# Patient Record
Sex: FEMALE | Race: WHITE | Employment: OTHER | URBAN - METROPOLITAN AREA
[De-identification: names, ages, dates, MRNs, and addresses within clinical notes are randomized per-mention and may not be internally consistent; named-entity substitution may affect disease eponyms.]

---

## 2017-01-01 ENCOUNTER — HOSPITAL ENCOUNTER (OUTPATIENT)
Dept: OTHER | Age: 77
Discharge: HOME | End: 2017-01-31
Attending: INTERNAL MEDICINE | Admitting: INTERNAL MEDICINE

## 2017-01-03 RX ORDER — FOLIC ACID 1 MG/1
TABLET ORAL
Qty: 90 TABLET | Refills: 1 | Status: SHIPPED | OUTPATIENT
Start: 2017-01-03 | End: 2017-07-15 | Stop reason: SDUPTHER

## 2017-01-03 RX ORDER — OXYBUTYNIN CHLORIDE 5 MG/1
TABLET ORAL
Qty: 180 TABLET | Refills: 1 | Status: SHIPPED | OUTPATIENT
Start: 2017-01-03 | End: 2017-05-26 | Stop reason: SDUPTHER

## 2017-01-03 RX ORDER — ATORVASTATIN CALCIUM 40 MG/1
TABLET, FILM COATED ORAL
Qty: 90 TABLET | Refills: 1 | Status: SHIPPED | OUTPATIENT
Start: 2017-01-03 | End: 2017-05-26 | Stop reason: SDUPTHER

## 2017-01-03 RX ORDER — LEVOTHYROXINE SODIUM 0.1 MG/1
TABLET ORAL
Qty: 90 TABLET | Refills: 1 | Status: SHIPPED | OUTPATIENT
Start: 2017-01-03 | End: 2017-05-26 | Stop reason: SDUPTHER

## 2017-01-09 ENCOUNTER — CARE COORDINATION (OUTPATIENT)
Dept: CARE COORDINATION | Age: 77
End: 2017-01-09

## 2017-01-16 ENCOUNTER — OFFICE VISIT (OUTPATIENT)
Dept: FAMILY MEDICINE CLINIC | Age: 77
End: 2017-01-16

## 2017-01-16 ENCOUNTER — CARE COORDINATOR VISIT (OUTPATIENT)
Dept: CARE COORDINATION | Age: 77
End: 2017-01-16

## 2017-01-16 VITALS
HEART RATE: 83 BPM | WEIGHT: 190 LBS | SYSTOLIC BLOOD PRESSURE: 142 MMHG | RESPIRATION RATE: 16 BRPM | OXYGEN SATURATION: 98 % | BODY MASS INDEX: 31.62 KG/M2 | DIASTOLIC BLOOD PRESSURE: 86 MMHG

## 2017-01-16 DIAGNOSIS — E87.1 HYPONATREMIA: Primary | ICD-10-CM

## 2017-01-16 DIAGNOSIS — E53.8 B12 DEFICIENCY: ICD-10-CM

## 2017-01-16 DIAGNOSIS — F43.0 STRESS REACTION: ICD-10-CM

## 2017-01-16 LAB
ANION GAP SERPL CALCULATED.3IONS-SCNC: 17 MMOL/L (ref 3–16)
BUN BLDV-MCNC: 24 MG/DL (ref 7–20)
CALCIUM SERPL-MCNC: 8.8 MG/DL (ref 8.3–10.6)
CHLORIDE BLD-SCNC: 96 MMOL/L (ref 99–110)
CO2: 27 MMOL/L (ref 21–32)
CREAT SERPL-MCNC: 1.1 MG/DL (ref 0.6–1.2)
GFR AFRICAN AMERICAN: 58
GFR NON-AFRICAN AMERICAN: 48
GLUCOSE BLD-MCNC: 96 MG/DL (ref 70–99)
POTASSIUM SERPL-SCNC: 5.1 MMOL/L (ref 3.5–5.1)
SODIUM BLD-SCNC: 140 MMOL/L (ref 136–145)

## 2017-01-16 PROCEDURE — 1123F ACP DISCUSS/DSCN MKR DOCD: CPT | Performed by: FAMILY MEDICINE

## 2017-01-16 PROCEDURE — 1036F TOBACCO NON-USER: CPT | Performed by: FAMILY MEDICINE

## 2017-01-16 PROCEDURE — G8419 CALC BMI OUT NRM PARAM NOF/U: HCPCS | Performed by: FAMILY MEDICINE

## 2017-01-16 PROCEDURE — 1090F PRES/ABSN URINE INCON ASSESS: CPT | Performed by: FAMILY MEDICINE

## 2017-01-16 PROCEDURE — 36415 COLL VENOUS BLD VENIPUNCTURE: CPT | Performed by: FAMILY MEDICINE

## 2017-01-16 PROCEDURE — 4040F PNEUMOC VAC/ADMIN/RCVD: CPT | Performed by: FAMILY MEDICINE

## 2017-01-16 PROCEDURE — G8484 FLU IMMUNIZE NO ADMIN: HCPCS | Performed by: FAMILY MEDICINE

## 2017-01-16 PROCEDURE — G8399 PT W/DXA RESULTS DOCUMENT: HCPCS | Performed by: FAMILY MEDICINE

## 2017-01-16 PROCEDURE — 99213 OFFICE O/P EST LOW 20 MIN: CPT | Performed by: FAMILY MEDICINE

## 2017-01-16 PROCEDURE — 96372 THER/PROPH/DIAG INJ SC/IM: CPT | Performed by: FAMILY MEDICINE

## 2017-01-16 PROCEDURE — G8427 DOCREV CUR MEDS BY ELIG CLIN: HCPCS | Performed by: FAMILY MEDICINE

## 2017-01-16 RX ORDER — CYANOCOBALAMIN 1000 UG/ML
1000 INJECTION INTRAMUSCULAR; SUBCUTANEOUS ONCE
Status: COMPLETED | OUTPATIENT
Start: 2017-01-16 | End: 2017-01-16

## 2017-01-16 RX ADMIN — CYANOCOBALAMIN 1000 MCG: 1000 INJECTION INTRAMUSCULAR; SUBCUTANEOUS at 10:44

## 2017-01-24 ENCOUNTER — OFFICE VISIT (OUTPATIENT)
Dept: FAMILY MEDICINE CLINIC | Age: 77
End: 2017-01-24

## 2017-01-24 ENCOUNTER — TELEPHONE (OUTPATIENT)
Dept: CARDIOLOGY CLINIC | Age: 77
End: 2017-01-24

## 2017-01-24 VITALS
OXYGEN SATURATION: 98 % | HEART RATE: 86 BPM | TEMPERATURE: 98.1 F | DIASTOLIC BLOOD PRESSURE: 58 MMHG | BODY MASS INDEX: 31.62 KG/M2 | SYSTOLIC BLOOD PRESSURE: 118 MMHG | WEIGHT: 190 LBS

## 2017-01-24 DIAGNOSIS — J20.9 COPD WITH ACUTE BRONCHITIS (HCC): Primary | ICD-10-CM

## 2017-01-24 DIAGNOSIS — J44.0 COPD WITH ACUTE BRONCHITIS (HCC): Primary | ICD-10-CM

## 2017-01-24 PROCEDURE — 4040F PNEUMOC VAC/ADMIN/RCVD: CPT | Performed by: NURSE PRACTITIONER

## 2017-01-24 PROCEDURE — G8926 SPIRO NO PERF OR DOC: HCPCS | Performed by: NURSE PRACTITIONER

## 2017-01-24 PROCEDURE — G8427 DOCREV CUR MEDS BY ELIG CLIN: HCPCS | Performed by: NURSE PRACTITIONER

## 2017-01-24 PROCEDURE — 1090F PRES/ABSN URINE INCON ASSESS: CPT | Performed by: NURSE PRACTITIONER

## 2017-01-24 PROCEDURE — 99213 OFFICE O/P EST LOW 20 MIN: CPT | Performed by: NURSE PRACTITIONER

## 2017-01-24 PROCEDURE — G8399 PT W/DXA RESULTS DOCUMENT: HCPCS | Performed by: NURSE PRACTITIONER

## 2017-01-24 PROCEDURE — G8484 FLU IMMUNIZE NO ADMIN: HCPCS | Performed by: NURSE PRACTITIONER

## 2017-01-24 PROCEDURE — G8419 CALC BMI OUT NRM PARAM NOF/U: HCPCS | Performed by: NURSE PRACTITIONER

## 2017-01-24 PROCEDURE — 3023F SPIROM DOC REV: CPT | Performed by: NURSE PRACTITIONER

## 2017-01-24 PROCEDURE — 1123F ACP DISCUSS/DSCN MKR DOCD: CPT | Performed by: NURSE PRACTITIONER

## 2017-01-24 PROCEDURE — 1036F TOBACCO NON-USER: CPT | Performed by: NURSE PRACTITIONER

## 2017-01-24 RX ORDER — DOXYCYCLINE HYCLATE 100 MG
100 TABLET ORAL 2 TIMES DAILY
Qty: 20 TABLET | Refills: 0 | Status: SHIPPED | OUTPATIENT
Start: 2017-01-24 | End: 2017-02-03

## 2017-01-24 RX ORDER — PREDNISONE 10 MG/1
TABLET ORAL
Qty: 21 EACH | Refills: 0 | Status: SHIPPED | OUTPATIENT
Start: 2017-01-24 | End: 2017-02-28 | Stop reason: ALTCHOICE

## 2017-01-24 ASSESSMENT — ENCOUNTER SYMPTOMS
WHEEZING: 1
COUGH: 1
SHORTNESS OF BREATH: 0
SPUTUM PRODUCTION: 1

## 2017-01-26 RX ORDER — POLYETHYLENE GLYCOL 3350 17 G/17G
POWDER, FOR SOLUTION ORAL
Qty: 1581 G | Refills: 5 | Status: SHIPPED | OUTPATIENT
Start: 2017-01-26 | End: 2017-02-13 | Stop reason: SDUPTHER

## 2017-01-30 ENCOUNTER — ANTI-COAG VISIT (OUTPATIENT)
Dept: PHARMACY | Facility: CLINIC | Age: 77
End: 2017-01-30

## 2017-01-30 LAB — INR BLD: 3

## 2017-02-13 RX ORDER — POLYETHYLENE GLYCOL 3350 17 G/17G
POWDER, FOR SOLUTION ORAL
Qty: 1581 G | Refills: 5 | Status: SHIPPED | OUTPATIENT
Start: 2017-02-13 | End: 2018-02-22 | Stop reason: SDUPTHER

## 2017-02-15 DIAGNOSIS — Z71.89 ENCOUNTER FOR MEDICATION REVIEW AND COUNSELING: ICD-10-CM

## 2017-02-15 RX ORDER — FUROSEMIDE 40 MG/1
TABLET ORAL
Qty: 90 TABLET | Refills: 1 | Status: ON HOLD | OUTPATIENT
Start: 2017-02-15 | End: 2017-03-06

## 2017-02-16 ENCOUNTER — NURSE ONLY (OUTPATIENT)
Dept: FAMILY MEDICINE CLINIC | Age: 77
End: 2017-02-16

## 2017-02-16 DIAGNOSIS — E53.8 B12 DEFICIENCY: Primary | ICD-10-CM

## 2017-02-16 PROCEDURE — 96372 THER/PROPH/DIAG INJ SC/IM: CPT | Performed by: FAMILY MEDICINE

## 2017-02-16 RX ORDER — CYANOCOBALAMIN 1000 UG/ML
1000 INJECTION INTRAMUSCULAR; SUBCUTANEOUS ONCE
Status: COMPLETED | OUTPATIENT
Start: 2017-02-16 | End: 2017-02-16

## 2017-02-16 RX ADMIN — CYANOCOBALAMIN 1000 MCG: 1000 INJECTION INTRAMUSCULAR; SUBCUTANEOUS at 09:11

## 2017-02-24 RX ORDER — METOPROLOL TARTRATE 100 MG/1
TABLET ORAL
Qty: 270 TABLET | Refills: 1 | Status: SHIPPED | OUTPATIENT
Start: 2017-02-24 | End: 2017-08-30 | Stop reason: SDUPTHER

## 2017-02-27 ENCOUNTER — ANTI-COAG VISIT (OUTPATIENT)
Dept: PHARMACY | Facility: CLINIC | Age: 77
End: 2017-02-27

## 2017-02-27 LAB — INR BLD: 2.4

## 2017-02-28 ENCOUNTER — OFFICE VISIT (OUTPATIENT)
Dept: CARDIOLOGY CLINIC | Age: 77
End: 2017-02-28

## 2017-02-28 VITALS
HEIGHT: 65 IN | WEIGHT: 194 LBS | SYSTOLIC BLOOD PRESSURE: 132 MMHG | HEART RATE: 84 BPM | DIASTOLIC BLOOD PRESSURE: 60 MMHG | OXYGEN SATURATION: 97 % | BODY MASS INDEX: 32.32 KG/M2

## 2017-02-28 DIAGNOSIS — I35.0 NONRHEUMATIC AORTIC VALVE STENOSIS: ICD-10-CM

## 2017-02-28 DIAGNOSIS — R06.02 SOB (SHORTNESS OF BREATH): ICD-10-CM

## 2017-02-28 DIAGNOSIS — M54.5 LOW BACK PAIN, UNSPECIFIED BACK PAIN LATERALITY, UNSPECIFIED CHRONICITY, WITH SCIATICA PRESENCE UNSPECIFIED: ICD-10-CM

## 2017-02-28 DIAGNOSIS — I50.9 CHF (CONGESTIVE HEART FAILURE), NYHA CLASS III, UNSPECIFIED FAILURE CHRONICITY, UNSPECIFIED TYPE (HCC): Primary | Chronic | ICD-10-CM

## 2017-02-28 PROBLEM — M54.9 BACK PAIN: Status: ACTIVE | Noted: 2017-02-28

## 2017-02-28 PROCEDURE — 1036F TOBACCO NON-USER: CPT | Performed by: INTERNAL MEDICINE

## 2017-02-28 PROCEDURE — G8427 DOCREV CUR MEDS BY ELIG CLIN: HCPCS | Performed by: INTERNAL MEDICINE

## 2017-02-28 PROCEDURE — 4040F PNEUMOC VAC/ADMIN/RCVD: CPT | Performed by: INTERNAL MEDICINE

## 2017-02-28 PROCEDURE — 99214 OFFICE O/P EST MOD 30 MIN: CPT | Performed by: INTERNAL MEDICINE

## 2017-02-28 PROCEDURE — 1123F ACP DISCUSS/DSCN MKR DOCD: CPT | Performed by: INTERNAL MEDICINE

## 2017-02-28 PROCEDURE — G8399 PT W/DXA RESULTS DOCUMENT: HCPCS | Performed by: INTERNAL MEDICINE

## 2017-02-28 PROCEDURE — 1090F PRES/ABSN URINE INCON ASSESS: CPT | Performed by: INTERNAL MEDICINE

## 2017-02-28 PROCEDURE — G8484 FLU IMMUNIZE NO ADMIN: HCPCS | Performed by: INTERNAL MEDICINE

## 2017-02-28 PROCEDURE — G8417 CALC BMI ABV UP PARAM F/U: HCPCS | Performed by: INTERNAL MEDICINE

## 2017-03-01 ENCOUNTER — CARE COORDINATION (OUTPATIENT)
Dept: CARE COORDINATION | Age: 77
End: 2017-03-01

## 2017-03-02 PROBLEM — I16.0 HYPERTENSIVE URGENCY: Status: ACTIVE | Noted: 2017-03-02

## 2017-03-02 PROBLEM — J81.0 ACUTE PULMONARY EDEMA (HCC): Status: ACTIVE | Noted: 2017-03-02

## 2017-03-02 PROBLEM — E66.9 OBESITY: Chronic | Status: ACTIVE | Noted: 2017-03-02

## 2017-03-02 PROBLEM — Z86.73 HISTORY OF CVA (CEREBROVASCULAR ACCIDENT): Chronic | Status: ACTIVE | Noted: 2017-03-02

## 2017-03-02 PROBLEM — Z87.891 FORMER SMOKER: Status: ACTIVE | Noted: 2017-03-02

## 2017-03-02 PROBLEM — I50.9 ADHF (ACUTE DECOMPENSATED HEART FAILURE): Status: ACTIVE | Noted: 2017-03-02

## 2017-03-02 PROBLEM — J96.01 ACUTE HYPOXEMIC RESPIRATORY FAILURE (HCC): Status: ACTIVE | Noted: 2017-03-02

## 2017-03-03 PROBLEM — Z87.891 FORMER SMOKER: Chronic | Status: ACTIVE | Noted: 2017-03-02

## 2017-03-06 ENCOUNTER — CARE COORDINATOR VISIT (OUTPATIENT)
Dept: CASE MANAGEMENT | Age: 77
End: 2017-03-06

## 2017-03-07 ENCOUNTER — CARE COORDINATION (OUTPATIENT)
Dept: CASE MANAGEMENT | Age: 77
End: 2017-03-07

## 2017-03-07 ENCOUNTER — TELEPHONE (OUTPATIENT)
Dept: PHARMACY | Facility: CLINIC | Age: 77
End: 2017-03-07

## 2017-03-08 ENCOUNTER — ANTI-COAG VISIT (OUTPATIENT)
Dept: PHARMACY | Facility: CLINIC | Age: 77
End: 2017-03-08

## 2017-03-08 ENCOUNTER — TELEPHONE (OUTPATIENT)
Dept: FAMILY MEDICINE CLINIC | Age: 77
End: 2017-03-08

## 2017-03-08 LAB — INR BLD: 1.8

## 2017-03-09 ENCOUNTER — CARE COORDINATION (OUTPATIENT)
Dept: CASE MANAGEMENT | Age: 77
End: 2017-03-09

## 2017-03-10 ENCOUNTER — OFFICE VISIT (OUTPATIENT)
Dept: CARDIOLOGY CLINIC | Age: 77
End: 2017-03-10

## 2017-03-10 VITALS
SYSTOLIC BLOOD PRESSURE: 112 MMHG | WEIGHT: 188 LBS | DIASTOLIC BLOOD PRESSURE: 50 MMHG | OXYGEN SATURATION: 99 % | BODY MASS INDEX: 31.32 KG/M2 | HEIGHT: 65 IN | HEART RATE: 94 BPM

## 2017-03-10 DIAGNOSIS — I50.32 CHF (CONGESTIVE HEART FAILURE), NYHA CLASS III, CHRONIC, DIASTOLIC (HCC): Primary | Chronic | ICD-10-CM

## 2017-03-10 DIAGNOSIS — Z79.01 CHRONIC ANTICOAGULATION: Chronic | ICD-10-CM

## 2017-03-10 PROCEDURE — G8598 ASA/ANTIPLAT THER USED: HCPCS | Performed by: INTERNAL MEDICINE

## 2017-03-10 PROCEDURE — G8417 CALC BMI ABV UP PARAM F/U: HCPCS | Performed by: INTERNAL MEDICINE

## 2017-03-10 PROCEDURE — 99213 OFFICE O/P EST LOW 20 MIN: CPT | Performed by: INTERNAL MEDICINE

## 2017-03-10 PROCEDURE — 4040F PNEUMOC VAC/ADMIN/RCVD: CPT | Performed by: INTERNAL MEDICINE

## 2017-03-10 PROCEDURE — G8484 FLU IMMUNIZE NO ADMIN: HCPCS | Performed by: INTERNAL MEDICINE

## 2017-03-10 PROCEDURE — 1036F TOBACCO NON-USER: CPT | Performed by: INTERNAL MEDICINE

## 2017-03-10 PROCEDURE — 1111F DSCHRG MED/CURRENT MED MERGE: CPT | Performed by: INTERNAL MEDICINE

## 2017-03-10 PROCEDURE — G8427 DOCREV CUR MEDS BY ELIG CLIN: HCPCS | Performed by: INTERNAL MEDICINE

## 2017-03-10 PROCEDURE — 1123F ACP DISCUSS/DSCN MKR DOCD: CPT | Performed by: INTERNAL MEDICINE

## 2017-03-10 PROCEDURE — 1090F PRES/ABSN URINE INCON ASSESS: CPT | Performed by: INTERNAL MEDICINE

## 2017-03-10 PROCEDURE — G8399 PT W/DXA RESULTS DOCUMENT: HCPCS | Performed by: INTERNAL MEDICINE

## 2017-03-13 ENCOUNTER — ANTI-COAG VISIT (OUTPATIENT)
Dept: PHARMACY | Facility: CLINIC | Age: 77
End: 2017-03-13

## 2017-03-13 ENCOUNTER — CARE COORDINATION (OUTPATIENT)
Dept: CASE MANAGEMENT | Age: 77
End: 2017-03-13

## 2017-03-13 LAB
BUN BLDV-MCNC: 25 MG/DL (ref 8–26)
CALCIUM SERPL-MCNC: 8.8 MG/DL (ref 8.5–10.5)
CHLORIDE BLD-SCNC: 91 MEQ/L (ref 101–111)
CO2: 32 MEQ/L (ref 24–36)
CREAT SERPL-MCNC: 1.19 MG/DL (ref 0.44–1.03)
GFR AFRICAN AMERICAN: 53 ML/MIN/1.73 SQ METER
GFR NON-AFRICAN AMERICAN: 44 ML/MIN/1.73 SQ METER
GLUCOSE BLD-MCNC: 89 MG/DL (ref 70–99)
INR BLD: 2.2
OSMOLALITY CALCULATION: 270 MOSM/KG (ref 280–300)
POTASSIUM SERPL-SCNC: 4.8 MEQ/L (ref 3.6–5.4)
SODIUM BLD-SCNC: 133 MEQ/L (ref 135–145)

## 2017-03-15 ENCOUNTER — OFFICE VISIT (OUTPATIENT)
Dept: CARDIOLOGY CLINIC | Age: 77
End: 2017-03-15

## 2017-03-15 VITALS — SYSTOLIC BLOOD PRESSURE: 122 MMHG | DIASTOLIC BLOOD PRESSURE: 54 MMHG | OXYGEN SATURATION: 97 % | HEART RATE: 87 BPM

## 2017-03-15 DIAGNOSIS — R06.09 DYSPNEA ON EXERTION: ICD-10-CM

## 2017-03-15 DIAGNOSIS — I50.32 CHRONIC DIASTOLIC CHF (CONGESTIVE HEART FAILURE) (HCC): ICD-10-CM

## 2017-03-15 DIAGNOSIS — I10 ESSENTIAL HYPERTENSION: Chronic | ICD-10-CM

## 2017-03-15 DIAGNOSIS — I50.32 CHF (CONGESTIVE HEART FAILURE), NYHA CLASS III, CHRONIC, DIASTOLIC (HCC): Primary | Chronic | ICD-10-CM

## 2017-03-15 PROBLEM — J81.0 ACUTE PULMONARY EDEMA (HCC): Status: RESOLVED | Noted: 2017-03-02 | Resolved: 2017-03-15

## 2017-03-15 PROBLEM — I50.9 ADHF (ACUTE DECOMPENSATED HEART FAILURE): Status: RESOLVED | Noted: 2017-03-02 | Resolved: 2017-03-15

## 2017-03-15 PROBLEM — J96.01 ACUTE HYPOXEMIC RESPIRATORY FAILURE (HCC): Status: RESOLVED | Noted: 2017-03-02 | Resolved: 2017-03-15

## 2017-03-15 PROCEDURE — 1123F ACP DISCUSS/DSCN MKR DOCD: CPT | Performed by: NURSE PRACTITIONER

## 2017-03-15 PROCEDURE — 1036F TOBACCO NON-USER: CPT | Performed by: NURSE PRACTITIONER

## 2017-03-15 PROCEDURE — G8598 ASA/ANTIPLAT THER USED: HCPCS | Performed by: NURSE PRACTITIONER

## 2017-03-15 PROCEDURE — G8484 FLU IMMUNIZE NO ADMIN: HCPCS | Performed by: NURSE PRACTITIONER

## 2017-03-15 PROCEDURE — 1111F DSCHRG MED/CURRENT MED MERGE: CPT | Performed by: NURSE PRACTITIONER

## 2017-03-15 PROCEDURE — 4040F PNEUMOC VAC/ADMIN/RCVD: CPT | Performed by: NURSE PRACTITIONER

## 2017-03-15 PROCEDURE — 1090F PRES/ABSN URINE INCON ASSESS: CPT | Performed by: NURSE PRACTITIONER

## 2017-03-15 PROCEDURE — 99214 OFFICE O/P EST MOD 30 MIN: CPT | Performed by: NURSE PRACTITIONER

## 2017-03-15 PROCEDURE — G8399 PT W/DXA RESULTS DOCUMENT: HCPCS | Performed by: NURSE PRACTITIONER

## 2017-03-15 PROCEDURE — G8427 DOCREV CUR MEDS BY ELIG CLIN: HCPCS | Performed by: NURSE PRACTITIONER

## 2017-03-15 PROCEDURE — G8417 CALC BMI ABV UP PARAM F/U: HCPCS | Performed by: NURSE PRACTITIONER

## 2017-03-16 ENCOUNTER — OFFICE VISIT (OUTPATIENT)
Dept: FAMILY MEDICINE CLINIC | Age: 77
End: 2017-03-16

## 2017-03-16 ENCOUNTER — CARE COORDINATION (OUTPATIENT)
Dept: CASE MANAGEMENT | Age: 77
End: 2017-03-16

## 2017-03-16 ENCOUNTER — TELEPHONE (OUTPATIENT)
Dept: CARDIOLOGY | Age: 77
End: 2017-03-16

## 2017-03-16 VITALS
WEIGHT: 193 LBS | DIASTOLIC BLOOD PRESSURE: 80 MMHG | OXYGEN SATURATION: 96 % | HEART RATE: 93 BPM | SYSTOLIC BLOOD PRESSURE: 130 MMHG | RESPIRATION RATE: 16 BRPM | BODY MASS INDEX: 32.12 KG/M2

## 2017-03-16 DIAGNOSIS — E53.8 B12 DEFICIENCY: ICD-10-CM

## 2017-03-16 DIAGNOSIS — Z09 HOSPITAL DISCHARGE FOLLOW-UP: Primary | ICD-10-CM

## 2017-03-16 DIAGNOSIS — I48.0 PAROXYSMAL ATRIAL FIBRILLATION (HCC): ICD-10-CM

## 2017-03-16 DIAGNOSIS — I50.32 DIASTOLIC CHF, CHRONIC (HCC): ICD-10-CM

## 2017-03-16 PROCEDURE — 96372 THER/PROPH/DIAG INJ SC/IM: CPT | Performed by: FAMILY MEDICINE

## 2017-03-16 RX ORDER — CYANOCOBALAMIN 1000 UG/ML
1000 INJECTION INTRAMUSCULAR; SUBCUTANEOUS ONCE
Status: COMPLETED | OUTPATIENT
Start: 2017-03-16 | End: 2017-03-16

## 2017-03-16 RX ADMIN — CYANOCOBALAMIN 1000 MCG: 1000 INJECTION INTRAMUSCULAR; SUBCUTANEOUS at 15:24

## 2017-03-19 ENCOUNTER — CARE COORDINATION (OUTPATIENT)
Dept: CASE MANAGEMENT | Age: 77
End: 2017-03-19

## 2017-03-20 ENCOUNTER — ANTI-COAG VISIT (OUTPATIENT)
Dept: PHARMACY | Facility: CLINIC | Age: 77
End: 2017-03-20

## 2017-03-20 LAB — INR BLD: 2.8

## 2017-03-22 ENCOUNTER — CARE COORDINATION (OUTPATIENT)
Dept: CARE COORDINATION | Age: 77
End: 2017-03-22

## 2017-03-22 RX ORDER — MONTELUKAST SODIUM 10 MG/1
TABLET ORAL
Qty: 90 TABLET | Refills: 1 | Status: SHIPPED | OUTPATIENT
Start: 2017-03-22 | End: 2017-08-30 | Stop reason: SDUPTHER

## 2017-03-22 RX ORDER — THIAMINE MONONITRATE (VIT B1) 100 MG
TABLET ORAL
Qty: 90 TABLET | Refills: 1 | Status: SHIPPED | OUTPATIENT
Start: 2017-03-22 | End: 2017-12-09 | Stop reason: SDUPTHER

## 2017-03-27 ENCOUNTER — ANTI-COAG VISIT (OUTPATIENT)
Dept: PHARMACY | Facility: CLINIC | Age: 77
End: 2017-03-27

## 2017-03-27 LAB — INR BLD: 2.5

## 2017-03-28 ENCOUNTER — CARE COORDINATION (OUTPATIENT)
Dept: CARE COORDINATION | Age: 77
End: 2017-03-28

## 2017-03-29 ENCOUNTER — OFFICE VISIT (OUTPATIENT)
Dept: CARDIOLOGY CLINIC | Age: 77
End: 2017-03-29

## 2017-03-29 VITALS
BODY MASS INDEX: 32.15 KG/M2 | HEART RATE: 107 BPM | WEIGHT: 193 LBS | HEIGHT: 65 IN | DIASTOLIC BLOOD PRESSURE: 50 MMHG | SYSTOLIC BLOOD PRESSURE: 129 MMHG

## 2017-03-29 DIAGNOSIS — R60.0 LOCALIZED EDEMA: ICD-10-CM

## 2017-03-29 DIAGNOSIS — I50.32 CHRONIC DIASTOLIC CHF (CONGESTIVE HEART FAILURE) (HCC): Primary | ICD-10-CM

## 2017-03-29 DIAGNOSIS — R00.2 PALPITATION: ICD-10-CM

## 2017-03-29 DIAGNOSIS — Z79.01 CHRONIC ANTICOAGULATION: Chronic | ICD-10-CM

## 2017-03-29 DIAGNOSIS — I48.0 PAROXYSMAL ATRIAL FIBRILLATION (HCC): Chronic | ICD-10-CM

## 2017-03-29 DIAGNOSIS — I50.32 DIASTOLIC CHF, CHRONIC (HCC): ICD-10-CM

## 2017-03-29 DIAGNOSIS — I10 ESSENTIAL HYPERTENSION: Chronic | ICD-10-CM

## 2017-03-29 PROBLEM — I16.0 HYPERTENSIVE URGENCY: Status: RESOLVED | Noted: 2017-03-02 | Resolved: 2017-03-29

## 2017-03-29 PROCEDURE — G8417 CALC BMI ABV UP PARAM F/U: HCPCS | Performed by: NURSE PRACTITIONER

## 2017-03-29 PROCEDURE — G8427 DOCREV CUR MEDS BY ELIG CLIN: HCPCS | Performed by: NURSE PRACTITIONER

## 2017-03-29 PROCEDURE — 1036F TOBACCO NON-USER: CPT | Performed by: NURSE PRACTITIONER

## 2017-03-29 PROCEDURE — 1111F DSCHRG MED/CURRENT MED MERGE: CPT | Performed by: NURSE PRACTITIONER

## 2017-03-29 PROCEDURE — G8484 FLU IMMUNIZE NO ADMIN: HCPCS | Performed by: NURSE PRACTITIONER

## 2017-03-29 PROCEDURE — 1123F ACP DISCUSS/DSCN MKR DOCD: CPT | Performed by: NURSE PRACTITIONER

## 2017-03-29 PROCEDURE — G8598 ASA/ANTIPLAT THER USED: HCPCS | Performed by: NURSE PRACTITIONER

## 2017-03-29 PROCEDURE — G8399 PT W/DXA RESULTS DOCUMENT: HCPCS | Performed by: NURSE PRACTITIONER

## 2017-03-29 PROCEDURE — 1090F PRES/ABSN URINE INCON ASSESS: CPT | Performed by: NURSE PRACTITIONER

## 2017-03-29 PROCEDURE — 4040F PNEUMOC VAC/ADMIN/RCVD: CPT | Performed by: NURSE PRACTITIONER

## 2017-03-29 PROCEDURE — 99214 OFFICE O/P EST MOD 30 MIN: CPT | Performed by: NURSE PRACTITIONER

## 2017-03-30 ENCOUNTER — HOSPITAL ENCOUNTER (OUTPATIENT)
Dept: CARDIOLOGY | Facility: CLINIC | Age: 77
Discharge: HOME | End: 2017-03-30
Attending: INTERNAL MEDICINE | Admitting: INTERNAL MEDICINE

## 2017-03-30 LAB
LV EF: 65 %
LVEF MODALITY: NORMAL

## 2017-03-31 ENCOUNTER — TELEPHONE (OUTPATIENT)
Dept: CARDIOLOGY CLINIC | Age: 77
End: 2017-03-31

## 2017-04-03 ENCOUNTER — ANTI-COAG VISIT (OUTPATIENT)
Dept: PHARMACY | Facility: CLINIC | Age: 77
End: 2017-04-03

## 2017-04-03 LAB — INR BLD: 2.5

## 2017-04-04 PROCEDURE — 99495 TRANSJ CARE MGMT MOD F2F 14D: CPT | Performed by: FAMILY MEDICINE

## 2017-04-10 ENCOUNTER — ANTI-COAG VISIT (OUTPATIENT)
Dept: PHARMACY | Facility: CLINIC | Age: 77
End: 2017-04-10

## 2017-04-10 LAB — INR BLD: 2.5

## 2017-04-11 ENCOUNTER — OFFICE VISIT (OUTPATIENT)
Dept: CARDIOLOGY CLINIC | Age: 77
End: 2017-04-11

## 2017-04-11 ENCOUNTER — HOSPITAL ENCOUNTER (OUTPATIENT)
Dept: GENERAL RADIOLOGY | Age: 77
Discharge: HOME | End: 2017-04-11
Attending: NURSE PRACTITIONER | Admitting: NURSE PRACTITIONER

## 2017-04-11 VITALS
SYSTOLIC BLOOD PRESSURE: 143 MMHG | WEIGHT: 196 LBS | BODY MASS INDEX: 32.62 KG/M2 | DIASTOLIC BLOOD PRESSURE: 55 MMHG | HEART RATE: 87 BPM

## 2017-04-11 DIAGNOSIS — I50.32 CHRONIC DIASTOLIC CHF (CONGESTIVE HEART FAILURE) (HCC): ICD-10-CM

## 2017-04-11 DIAGNOSIS — R06.09 DYSPNEA ON EXERTION: ICD-10-CM

## 2017-04-11 DIAGNOSIS — R60.0 LOCALIZED EDEMA: ICD-10-CM

## 2017-04-11 DIAGNOSIS — I50.32 CHRONIC DIASTOLIC CHF (CONGESTIVE HEART FAILURE) (HCC): Primary | ICD-10-CM

## 2017-04-11 DIAGNOSIS — Z71.89 ENCOUNTER FOR MEDICATION REVIEW AND COUNSELING: ICD-10-CM

## 2017-04-11 LAB
ANION GAP SERPL CALCULATED.3IONS-SCNC: 16 MMOL/L (ref 3–16)
BUN BLDV-MCNC: 31 MG/DL (ref 7–20)
CALCIUM SERPL-MCNC: 9 MG/DL (ref 8.3–10.6)
CHLORIDE BLD-SCNC: 93 MMOL/L (ref 99–110)
CO2: 30 MMOL/L (ref 21–32)
CREAT SERPL-MCNC: 1.1 MG/DL (ref 0.6–1.2)
GFR AFRICAN AMERICAN: 58
GFR NON-AFRICAN AMERICAN: 48
GLUCOSE BLD-MCNC: 96 MG/DL (ref 70–99)
MAGNESIUM: 2.4 MG/DL (ref 1.8–2.4)
POTASSIUM SERPL-SCNC: 4.5 MMOL/L (ref 3.5–5.1)
PRO-BNP: 593 PG/ML (ref 0–449)
SODIUM BLD-SCNC: 139 MMOL/L (ref 136–145)

## 2017-04-11 PROCEDURE — 4040F PNEUMOC VAC/ADMIN/RCVD: CPT | Performed by: NURSE PRACTITIONER

## 2017-04-11 PROCEDURE — G8598 ASA/ANTIPLAT THER USED: HCPCS | Performed by: NURSE PRACTITIONER

## 2017-04-11 PROCEDURE — G8399 PT W/DXA RESULTS DOCUMENT: HCPCS | Performed by: NURSE PRACTITIONER

## 2017-04-11 PROCEDURE — 99214 OFFICE O/P EST MOD 30 MIN: CPT | Performed by: NURSE PRACTITIONER

## 2017-04-11 PROCEDURE — G8428 CUR MEDS NOT DOCUMENT: HCPCS | Performed by: NURSE PRACTITIONER

## 2017-04-11 PROCEDURE — 1123F ACP DISCUSS/DSCN MKR DOCD: CPT | Performed by: NURSE PRACTITIONER

## 2017-04-11 PROCEDURE — G8417 CALC BMI ABV UP PARAM F/U: HCPCS | Performed by: NURSE PRACTITIONER

## 2017-04-11 PROCEDURE — 1090F PRES/ABSN URINE INCON ASSESS: CPT | Performed by: NURSE PRACTITIONER

## 2017-04-11 PROCEDURE — 1036F TOBACCO NON-USER: CPT | Performed by: NURSE PRACTITIONER

## 2017-04-11 RX ORDER — FUROSEMIDE 40 MG/1
40 TABLET ORAL 2 TIMES DAILY
Qty: 135 TABLET | Refills: 1
Start: 2017-04-11 | End: 2017-05-26 | Stop reason: SDUPTHER

## 2017-04-11 RX ORDER — WARFARIN SODIUM 3 MG/1
TABLET ORAL
Qty: 270 TABLET | Refills: 0 | Status: SHIPPED | OUTPATIENT
Start: 2017-04-11 | End: 2017-07-31 | Stop reason: SDUPTHER

## 2017-04-12 ENCOUNTER — TELEPHONE (OUTPATIENT)
Dept: CARDIOLOGY CLINIC | Age: 77
End: 2017-04-12

## 2017-04-25 ENCOUNTER — HOSPITAL ENCOUNTER (OUTPATIENT)
Dept: GENERAL RADIOLOGY | Age: 77
Discharge: HOME | End: 2017-04-25
Attending: NURSE PRACTITIONER | Admitting: NURSE PRACTITIONER

## 2017-04-25 ENCOUNTER — OFFICE VISIT (OUTPATIENT)
Dept: CARDIOLOGY CLINIC | Age: 77
End: 2017-04-25

## 2017-04-25 VITALS
BODY MASS INDEX: 32.55 KG/M2 | HEART RATE: 87 BPM | DIASTOLIC BLOOD PRESSURE: 42 MMHG | WEIGHT: 195.6 LBS | OXYGEN SATURATION: 98 % | SYSTOLIC BLOOD PRESSURE: 143 MMHG

## 2017-04-25 DIAGNOSIS — R06.09 DYSPNEA ON EXERTION: ICD-10-CM

## 2017-04-25 DIAGNOSIS — I50.32 CHRONIC DIASTOLIC CHF (CONGESTIVE HEART FAILURE) (HCC): ICD-10-CM

## 2017-04-25 DIAGNOSIS — I35.0 NONRHEUMATIC AORTIC VALVE STENOSIS: ICD-10-CM

## 2017-04-25 DIAGNOSIS — I50.32 CHRONIC DIASTOLIC CHF (CONGESTIVE HEART FAILURE) (HCC): Primary | ICD-10-CM

## 2017-04-25 DIAGNOSIS — I48.0 PAROXYSMAL ATRIAL FIBRILLATION (HCC): ICD-10-CM

## 2017-04-25 LAB
ANION GAP SERPL CALCULATED.3IONS-SCNC: 14 MMOL/L (ref 3–16)
BUN BLDV-MCNC: 30 MG/DL (ref 7–20)
CALCIUM SERPL-MCNC: 8.6 MG/DL (ref 8.3–10.6)
CHLORIDE BLD-SCNC: 94 MMOL/L (ref 99–110)
CO2: 31 MMOL/L (ref 21–32)
CREAT SERPL-MCNC: 1.1 MG/DL (ref 0.6–1.2)
GFR AFRICAN AMERICAN: 58
GFR NON-AFRICAN AMERICAN: 48
GLUCOSE BLD-MCNC: 84 MG/DL (ref 70–99)
POTASSIUM SERPL-SCNC: 3.8 MMOL/L (ref 3.5–5.1)
PRO-BNP: 896 PG/ML (ref 0–449)
SODIUM BLD-SCNC: 139 MMOL/L (ref 136–145)

## 2017-04-25 PROCEDURE — 99214 OFFICE O/P EST MOD 30 MIN: CPT | Performed by: NURSE PRACTITIONER

## 2017-04-25 PROCEDURE — G8399 PT W/DXA RESULTS DOCUMENT: HCPCS | Performed by: NURSE PRACTITIONER

## 2017-04-25 PROCEDURE — 1090F PRES/ABSN URINE INCON ASSESS: CPT | Performed by: NURSE PRACTITIONER

## 2017-04-25 PROCEDURE — 1123F ACP DISCUSS/DSCN MKR DOCD: CPT | Performed by: NURSE PRACTITIONER

## 2017-04-25 PROCEDURE — G8417 CALC BMI ABV UP PARAM F/U: HCPCS | Performed by: NURSE PRACTITIONER

## 2017-04-25 PROCEDURE — 4040F PNEUMOC VAC/ADMIN/RCVD: CPT | Performed by: NURSE PRACTITIONER

## 2017-04-25 PROCEDURE — G8427 DOCREV CUR MEDS BY ELIG CLIN: HCPCS | Performed by: NURSE PRACTITIONER

## 2017-04-25 PROCEDURE — 1036F TOBACCO NON-USER: CPT | Performed by: NURSE PRACTITIONER

## 2017-04-25 PROCEDURE — G8598 ASA/ANTIPLAT THER USED: HCPCS | Performed by: NURSE PRACTITIONER

## 2017-04-26 DIAGNOSIS — R06.09 DYSPNEA ON EXERTION: ICD-10-CM

## 2017-04-26 DIAGNOSIS — I50.32 CHRONIC DIASTOLIC HEART FAILURE (HCC): Primary | ICD-10-CM

## 2017-05-04 ENCOUNTER — CARE COORDINATION (OUTPATIENT)
Dept: CARE COORDINATION | Age: 77
End: 2017-05-04

## 2017-05-08 ENCOUNTER — ANTI-COAG VISIT (OUTPATIENT)
Dept: PHARMACY | Facility: CLINIC | Age: 77
End: 2017-05-08

## 2017-05-08 LAB — INR BLD: 2.6

## 2017-05-18 ENCOUNTER — OFFICE VISIT (OUTPATIENT)
Dept: FAMILY MEDICINE CLINIC | Age: 77
End: 2017-05-18

## 2017-05-18 VITALS
WEIGHT: 200 LBS | SYSTOLIC BLOOD PRESSURE: 138 MMHG | DIASTOLIC BLOOD PRESSURE: 80 MMHG | BODY MASS INDEX: 33.28 KG/M2 | RESPIRATION RATE: 18 BRPM | HEART RATE: 81 BPM | OXYGEN SATURATION: 96 %

## 2017-05-18 DIAGNOSIS — R26.89 POOR BALANCE: Primary | ICD-10-CM

## 2017-05-18 DIAGNOSIS — G62.9 NEUROPATHY: ICD-10-CM

## 2017-05-18 DIAGNOSIS — I50.32 CHRONIC DIASTOLIC CONGESTIVE HEART FAILURE (HCC): ICD-10-CM

## 2017-05-18 DIAGNOSIS — R73.01 IFG (IMPAIRED FASTING GLUCOSE): ICD-10-CM

## 2017-05-18 DIAGNOSIS — E03.9 ACQUIRED HYPOTHYROIDISM: ICD-10-CM

## 2017-05-18 DIAGNOSIS — R41.0 CONFUSION: ICD-10-CM

## 2017-05-18 LAB
ANION GAP SERPL CALCULATED.3IONS-SCNC: 14 MMOL/L (ref 3–16)
BUN BLDV-MCNC: 25 MG/DL (ref 7–20)
CALCIUM SERPL-MCNC: 8.7 MG/DL (ref 8.3–10.6)
CHLORIDE BLD-SCNC: 90 MMOL/L (ref 99–110)
CO2: 31 MMOL/L (ref 21–32)
CREAT SERPL-MCNC: 1.2 MG/DL (ref 0.6–1.2)
GFR AFRICAN AMERICAN: 52
GFR NON-AFRICAN AMERICAN: 43
GLUCOSE BLD-MCNC: 99 MG/DL (ref 70–99)
POTASSIUM SERPL-SCNC: 4.8 MMOL/L (ref 3.5–5.1)
SODIUM BLD-SCNC: 135 MMOL/L (ref 136–145)
T4 FREE: 1.7 NG/DL (ref 0.9–1.8)
TSH SERPL DL<=0.05 MIU/L-ACNC: 2.28 UIU/ML (ref 0.27–4.2)

## 2017-05-18 PROCEDURE — 4040F PNEUMOC VAC/ADMIN/RCVD: CPT | Performed by: FAMILY MEDICINE

## 2017-05-18 PROCEDURE — G8417 CALC BMI ABV UP PARAM F/U: HCPCS | Performed by: FAMILY MEDICINE

## 2017-05-18 PROCEDURE — G8427 DOCREV CUR MEDS BY ELIG CLIN: HCPCS | Performed by: FAMILY MEDICINE

## 2017-05-18 PROCEDURE — 99214 OFFICE O/P EST MOD 30 MIN: CPT | Performed by: FAMILY MEDICINE

## 2017-05-18 PROCEDURE — 1090F PRES/ABSN URINE INCON ASSESS: CPT | Performed by: FAMILY MEDICINE

## 2017-05-18 PROCEDURE — 1036F TOBACCO NON-USER: CPT | Performed by: FAMILY MEDICINE

## 2017-05-18 PROCEDURE — G8598 ASA/ANTIPLAT THER USED: HCPCS | Performed by: FAMILY MEDICINE

## 2017-05-18 PROCEDURE — 36415 COLL VENOUS BLD VENIPUNCTURE: CPT | Performed by: FAMILY MEDICINE

## 2017-05-18 PROCEDURE — G8399 PT W/DXA RESULTS DOCUMENT: HCPCS | Performed by: FAMILY MEDICINE

## 2017-05-18 PROCEDURE — 1123F ACP DISCUSS/DSCN MKR DOCD: CPT | Performed by: FAMILY MEDICINE

## 2017-05-18 RX ORDER — SPIRONOLACTONE 25 MG/1
25 TABLET ORAL DAILY
Qty: 90 TABLET | Refills: 1 | Status: SHIPPED | OUTPATIENT
Start: 2017-05-18 | End: 2017-08-30 | Stop reason: SDUPTHER

## 2017-05-19 LAB
ESTIMATED AVERAGE GLUCOSE: 131.2 MG/DL
HBA1C MFR BLD: 6.2 %

## 2017-05-26 ENCOUNTER — TELEPHONE (OUTPATIENT)
Dept: CARDIOLOGY CLINIC | Age: 77
End: 2017-05-26

## 2017-05-26 DIAGNOSIS — Z71.89 ENCOUNTER FOR MEDICATION REVIEW AND COUNSELING: ICD-10-CM

## 2017-05-26 RX ORDER — FUROSEMIDE 40 MG/1
TABLET ORAL
Qty: 90 TABLET | Refills: 1 | Status: SHIPPED | OUTPATIENT
Start: 2017-05-26 | End: 2017-06-27 | Stop reason: SDUPTHER

## 2017-05-26 RX ORDER — OXYBUTYNIN CHLORIDE 5 MG/1
TABLET ORAL
Qty: 180 TABLET | Refills: 1 | Status: SHIPPED | OUTPATIENT
Start: 2017-05-26 | End: 2017-08-30 | Stop reason: SDUPTHER

## 2017-05-26 RX ORDER — VENLAFAXINE HYDROCHLORIDE 150 MG/1
CAPSULE, EXTENDED RELEASE ORAL
Qty: 90 CAPSULE | Refills: 1 | Status: SHIPPED | OUTPATIENT
Start: 2017-05-26 | End: 2018-01-13 | Stop reason: SDUPTHER

## 2017-05-26 RX ORDER — PANTOPRAZOLE SODIUM 40 MG/1
TABLET, DELAYED RELEASE ORAL
Qty: 180 TABLET | Refills: 1 | Status: SHIPPED | OUTPATIENT
Start: 2017-05-26 | End: 2018-01-13 | Stop reason: SDUPTHER

## 2017-05-26 RX ORDER — ATORVASTATIN CALCIUM 40 MG/1
TABLET, FILM COATED ORAL
Qty: 90 TABLET | Refills: 1 | Status: SHIPPED | OUTPATIENT
Start: 2017-05-26 | End: 2018-01-13 | Stop reason: SDUPTHER

## 2017-05-26 RX ORDER — LEVOTHYROXINE SODIUM 0.1 MG/1
TABLET ORAL
Qty: 90 TABLET | Refills: 1 | Status: SHIPPED | OUTPATIENT
Start: 2017-05-26 | End: 2017-12-16 | Stop reason: SDUPTHER

## 2017-05-30 ENCOUNTER — HOSPITAL ENCOUNTER (OUTPATIENT)
Dept: CT IMAGING | Age: 77
Discharge: HOME | End: 2017-05-30
Attending: FAMILY MEDICINE | Admitting: FAMILY MEDICINE

## 2017-05-30 DIAGNOSIS — R41.0 CONFUSION: ICD-10-CM

## 2017-05-30 DIAGNOSIS — R26.89 POOR BALANCE: ICD-10-CM

## 2017-05-30 DIAGNOSIS — R26.89 OTHER ABNORMALITIES OF GAIT AND MOBILITY: ICD-10-CM

## 2017-06-05 DIAGNOSIS — R26.9 GAIT DISTURBANCE: Primary | ICD-10-CM

## 2017-06-05 DIAGNOSIS — R27.0 ATAXIA: ICD-10-CM

## 2017-06-12 ENCOUNTER — HOSPITAL ENCOUNTER (OUTPATIENT)
Dept: CT IMAGING | Age: 77
Discharge: HOME | End: 2017-06-12
Attending: INTERNAL MEDICINE | Admitting: INTERNAL MEDICINE

## 2017-06-12 DIAGNOSIS — R91.8 OTHER NONSPECIFIC ABNORMAL FINDING OF LUNG FIELD: ICD-10-CM

## 2017-06-12 DIAGNOSIS — R91.8 PULMONARY NODULES: ICD-10-CM

## 2017-06-14 ENCOUNTER — OFFICE VISIT (OUTPATIENT)
Dept: PULMONOLOGY | Age: 77
End: 2017-06-14

## 2017-06-14 VITALS
DIASTOLIC BLOOD PRESSURE: 58 MMHG | BODY MASS INDEX: 32.99 KG/M2 | RESPIRATION RATE: 18 BRPM | HEART RATE: 85 BPM | TEMPERATURE: 98.1 F | SYSTOLIC BLOOD PRESSURE: 105 MMHG | HEIGHT: 65 IN | OXYGEN SATURATION: 97 % | WEIGHT: 198 LBS

## 2017-06-14 DIAGNOSIS — J45.909 UNCOMPLICATED ASTHMA, UNSPECIFIED ASTHMA SEVERITY: ICD-10-CM

## 2017-06-14 DIAGNOSIS — J44.9 CHRONIC OBSTRUCTIVE PULMONARY DISEASE, UNSPECIFIED COPD TYPE (HCC): Primary | ICD-10-CM

## 2017-06-14 DIAGNOSIS — A31.0 PULMONARY MAI (MYCOBACTERIUM AVIUM-INTRACELLULARE) INFECTION (HCC): ICD-10-CM

## 2017-06-14 PROCEDURE — 1123F ACP DISCUSS/DSCN MKR DOCD: CPT | Performed by: INTERNAL MEDICINE

## 2017-06-14 PROCEDURE — 4040F PNEUMOC VAC/ADMIN/RCVD: CPT | Performed by: INTERNAL MEDICINE

## 2017-06-14 PROCEDURE — 1036F TOBACCO NON-USER: CPT | Performed by: INTERNAL MEDICINE

## 2017-06-14 PROCEDURE — 3023F SPIROM DOC REV: CPT | Performed by: INTERNAL MEDICINE

## 2017-06-14 PROCEDURE — G8399 PT W/DXA RESULTS DOCUMENT: HCPCS | Performed by: INTERNAL MEDICINE

## 2017-06-14 PROCEDURE — MISCLOD MISC IP SERVICE NONBILLABLE: Performed by: INTERNAL MEDICINE

## 2017-06-14 PROCEDURE — G8598 ASA/ANTIPLAT THER USED: HCPCS | Performed by: INTERNAL MEDICINE

## 2017-06-14 PROCEDURE — G8427 DOCREV CUR MEDS BY ELIG CLIN: HCPCS | Performed by: INTERNAL MEDICINE

## 2017-06-14 PROCEDURE — G8926 SPIRO NO PERF OR DOC: HCPCS | Performed by: INTERNAL MEDICINE

## 2017-06-14 PROCEDURE — G8417 CALC BMI ABV UP PARAM F/U: HCPCS | Performed by: INTERNAL MEDICINE

## 2017-06-14 PROCEDURE — 1090F PRES/ABSN URINE INCON ASSESS: CPT | Performed by: INTERNAL MEDICINE

## 2017-06-14 ASSESSMENT — SLEEP AND FATIGUE QUESTIONNAIRES
HOW LIKELY ARE YOU TO NOD OFF OR FALL ASLEEP WHILE SITTING QUIETLY AFTER LUNCH WITHOUT ALCOHOL: 0
HOW LIKELY ARE YOU TO NOD OFF OR FALL ASLEEP WHEN YOU ARE A PASSENGER IN A CAR FOR AN HOUR WITHOUT A BREAK: 2
HOW LIKELY ARE YOU TO NOD OFF OR FALL ASLEEP WHILE SITTING AND TALKING TO SOMEONE: 0
NECK CIRCUMFERENCE (INCHES): 13
HOW LIKELY ARE YOU TO NOD OFF OR FALL ASLEEP WHILE LYING DOWN TO REST IN THE AFTERNOON WHEN CIRCUMSTANCES PERMIT: 2
ESS TOTAL SCORE: 8
HOW LIKELY ARE YOU TO NOD OFF OR FALL ASLEEP WHILE SITTING AND READING: 2
HOW LIKELY ARE YOU TO NOD OFF OR FALL ASLEEP WHILE SITTING INACTIVE IN A PUBLIC PLACE: 0
HOW LIKELY ARE YOU TO NOD OFF OR FALL ASLEEP WHILE WATCHING TV: 2
HOW LIKELY ARE YOU TO NOD OFF OR FALL ASLEEP IN A CAR, WHILE STOPPED FOR A FEW MINUTES IN TRAFFIC: 0

## 2017-06-15 ENCOUNTER — HOSPITAL ENCOUNTER (OUTPATIENT)
Dept: PHYSICAL THERAPY | Age: 77
Discharge: HOME | End: 2017-06-30
Admitting: FAMILY MEDICINE

## 2017-06-19 ENCOUNTER — OFFICE VISIT (OUTPATIENT)
Dept: FAMILY MEDICINE CLINIC | Age: 77
End: 2017-06-19

## 2017-06-19 ENCOUNTER — CARE COORDINATION (OUTPATIENT)
Dept: CARE COORDINATION | Age: 77
End: 2017-06-19

## 2017-06-19 VITALS
BODY MASS INDEX: 32.78 KG/M2 | HEART RATE: 86 BPM | WEIGHT: 197 LBS | SYSTOLIC BLOOD PRESSURE: 104 MMHG | DIASTOLIC BLOOD PRESSURE: 70 MMHG | RESPIRATION RATE: 16 BRPM | OXYGEN SATURATION: 96 %

## 2017-06-19 DIAGNOSIS — L60.3 BRITTLE NAILS: ICD-10-CM

## 2017-06-19 DIAGNOSIS — I50.9 CONGESTIVE HEART FAILURE, UNSPECIFIED CONGESTIVE HEART FAILURE CHRONICITY, UNSPECIFIED CONGESTIVE HEART FAILURE TYPE: Primary | ICD-10-CM

## 2017-06-19 DIAGNOSIS — E53.8 B12 DEFICIENCY: ICD-10-CM

## 2017-06-19 DIAGNOSIS — G47.33 SLEEP APNEA, OBSTRUCTIVE: ICD-10-CM

## 2017-06-19 DIAGNOSIS — R26.9 GAIT DISTURBANCE: Primary | ICD-10-CM

## 2017-06-19 DIAGNOSIS — F43.0 STRESS REACTION: ICD-10-CM

## 2017-06-19 PROCEDURE — 1036F TOBACCO NON-USER: CPT | Performed by: FAMILY MEDICINE

## 2017-06-19 PROCEDURE — G8598 ASA/ANTIPLAT THER USED: HCPCS | Performed by: FAMILY MEDICINE

## 2017-06-19 PROCEDURE — 4040F PNEUMOC VAC/ADMIN/RCVD: CPT | Performed by: FAMILY MEDICINE

## 2017-06-19 PROCEDURE — G8399 PT W/DXA RESULTS DOCUMENT: HCPCS | Performed by: FAMILY MEDICINE

## 2017-06-19 PROCEDURE — G8427 DOCREV CUR MEDS BY ELIG CLIN: HCPCS | Performed by: FAMILY MEDICINE

## 2017-06-19 PROCEDURE — 1123F ACP DISCUSS/DSCN MKR DOCD: CPT | Performed by: FAMILY MEDICINE

## 2017-06-19 PROCEDURE — G8417 CALC BMI ABV UP PARAM F/U: HCPCS | Performed by: FAMILY MEDICINE

## 2017-06-19 PROCEDURE — 1090F PRES/ABSN URINE INCON ASSESS: CPT | Performed by: FAMILY MEDICINE

## 2017-06-19 PROCEDURE — 99214 OFFICE O/P EST MOD 30 MIN: CPT | Performed by: FAMILY MEDICINE

## 2017-06-19 PROCEDURE — 96372 THER/PROPH/DIAG INJ SC/IM: CPT | Performed by: FAMILY MEDICINE

## 2017-06-19 RX ORDER — CYANOCOBALAMIN 1000 UG/ML
1000 INJECTION INTRAMUSCULAR; SUBCUTANEOUS ONCE
Status: COMPLETED | OUTPATIENT
Start: 2017-06-19 | End: 2017-06-19

## 2017-06-19 RX ADMIN — CYANOCOBALAMIN 1000 MCG: 1000 INJECTION INTRAMUSCULAR; SUBCUTANEOUS at 11:15

## 2017-06-20 ENCOUNTER — HOSPITAL ENCOUNTER (OUTPATIENT)
Dept: PHYSICAL THERAPY | Age: 77
Discharge: HOME | End: 2017-06-20
Admitting: FAMILY MEDICINE

## 2017-06-23 ENCOUNTER — HOSPITAL ENCOUNTER (OUTPATIENT)
Dept: PHYSICAL THERAPY | Age: 77
Discharge: HOME | End: 2017-06-23
Admitting: FAMILY MEDICINE

## 2017-06-26 ENCOUNTER — HOSPITAL ENCOUNTER (OUTPATIENT)
Dept: PHYSICAL THERAPY | Age: 77
Discharge: HOME | End: 2017-06-26
Admitting: FAMILY MEDICINE

## 2017-06-27 ENCOUNTER — OFFICE VISIT (OUTPATIENT)
Dept: CARDIOLOGY CLINIC | Age: 77
End: 2017-06-27

## 2017-06-27 VITALS
HEART RATE: 84 BPM | SYSTOLIC BLOOD PRESSURE: 130 MMHG | WEIGHT: 198 LBS | HEIGHT: 65 IN | DIASTOLIC BLOOD PRESSURE: 60 MMHG | BODY MASS INDEX: 32.99 KG/M2

## 2017-06-27 DIAGNOSIS — I48.0 PAROXYSMAL ATRIAL FIBRILLATION (HCC): Chronic | ICD-10-CM

## 2017-06-27 DIAGNOSIS — I50.32 CHRONIC DIASTOLIC CHF (CONGESTIVE HEART FAILURE) (HCC): Primary | ICD-10-CM

## 2017-06-27 DIAGNOSIS — I10 ESSENTIAL HYPERTENSION: Chronic | ICD-10-CM

## 2017-06-27 DIAGNOSIS — R06.02 SOB (SHORTNESS OF BREATH): ICD-10-CM

## 2017-06-27 DIAGNOSIS — Z71.89 ENCOUNTER FOR MEDICATION REVIEW AND COUNSELING: ICD-10-CM

## 2017-06-27 PROCEDURE — G8427 DOCREV CUR MEDS BY ELIG CLIN: HCPCS | Performed by: INTERNAL MEDICINE

## 2017-06-27 PROCEDURE — 1123F ACP DISCUSS/DSCN MKR DOCD: CPT | Performed by: INTERNAL MEDICINE

## 2017-06-27 PROCEDURE — 1090F PRES/ABSN URINE INCON ASSESS: CPT | Performed by: INTERNAL MEDICINE

## 2017-06-27 PROCEDURE — G8598 ASA/ANTIPLAT THER USED: HCPCS | Performed by: INTERNAL MEDICINE

## 2017-06-27 PROCEDURE — 1036F TOBACCO NON-USER: CPT | Performed by: INTERNAL MEDICINE

## 2017-06-27 PROCEDURE — 99214 OFFICE O/P EST MOD 30 MIN: CPT | Performed by: INTERNAL MEDICINE

## 2017-06-27 PROCEDURE — G8417 CALC BMI ABV UP PARAM F/U: HCPCS | Performed by: INTERNAL MEDICINE

## 2017-06-27 PROCEDURE — 4040F PNEUMOC VAC/ADMIN/RCVD: CPT | Performed by: INTERNAL MEDICINE

## 2017-06-27 PROCEDURE — G8399 PT W/DXA RESULTS DOCUMENT: HCPCS | Performed by: INTERNAL MEDICINE

## 2017-06-27 RX ORDER — FUROSEMIDE 40 MG/1
40 TABLET ORAL 2 TIMES DAILY
Qty: 180 TABLET | Refills: 3 | Status: SHIPPED | OUTPATIENT
Start: 2017-06-27

## 2017-06-29 ENCOUNTER — HOSPITAL ENCOUNTER (OUTPATIENT)
Dept: PHYSICAL THERAPY | Age: 77
Discharge: HOME | End: 2017-06-29
Admitting: FAMILY MEDICINE

## 2017-07-13 ENCOUNTER — HOSPITAL ENCOUNTER (OUTPATIENT)
Dept: PHYSICAL THERAPY | Age: 77
Discharge: HOME | End: 2017-07-13
Admitting: FAMILY MEDICINE

## 2017-07-14 ENCOUNTER — ANTI-COAG VISIT (OUTPATIENT)
Dept: PHARMACY | Facility: CLINIC | Age: 77
End: 2017-07-14

## 2017-07-14 LAB — INR BLD: 3

## 2017-07-17 RX ORDER — FOLIC ACID 1 MG/1
TABLET ORAL
Qty: 90 TABLET | Refills: 3 | Status: SHIPPED | OUTPATIENT
Start: 2017-07-17

## 2017-07-18 ENCOUNTER — CARE COORDINATOR VISIT (OUTPATIENT)
Dept: CARE COORDINATION | Age: 77
End: 2017-07-18

## 2017-07-18 ENCOUNTER — NURSE ONLY (OUTPATIENT)
Dept: FAMILY MEDICINE CLINIC | Age: 77
End: 2017-07-18

## 2017-07-18 ENCOUNTER — HOSPITAL ENCOUNTER (OUTPATIENT)
Dept: PHYSICAL THERAPY | Age: 77
Discharge: HOME | End: 2017-07-18
Admitting: FAMILY MEDICINE

## 2017-07-18 VITALS — BODY MASS INDEX: 32.62 KG/M2 | WEIGHT: 196 LBS

## 2017-07-18 DIAGNOSIS — E53.8 B12 DEFICIENCY: Primary | ICD-10-CM

## 2017-07-18 PROCEDURE — 96372 THER/PROPH/DIAG INJ SC/IM: CPT | Performed by: FAMILY MEDICINE

## 2017-07-18 RX ORDER — CYANOCOBALAMIN 1000 UG/ML
1000 INJECTION INTRAMUSCULAR; SUBCUTANEOUS ONCE
Status: COMPLETED | OUTPATIENT
Start: 2017-07-18 | End: 2017-07-18

## 2017-07-18 RX ADMIN — CYANOCOBALAMIN 1000 MCG: 1000 INJECTION INTRAMUSCULAR; SUBCUTANEOUS at 10:48

## 2017-07-25 ENCOUNTER — HOSPITAL ENCOUNTER (OUTPATIENT)
Dept: PHYSICAL THERAPY | Age: 77
Discharge: HOME | End: 2017-07-25
Admitting: FAMILY MEDICINE

## 2017-07-28 ENCOUNTER — HOSPITAL ENCOUNTER (OUTPATIENT)
Dept: PHYSICAL THERAPY | Age: 77
Discharge: HOME | End: 2017-07-28
Admitting: FAMILY MEDICINE

## 2017-07-31 DIAGNOSIS — R42 DIZZY: ICD-10-CM

## 2017-07-31 DIAGNOSIS — R11.0 NAUSEA: ICD-10-CM

## 2017-07-31 RX ORDER — WARFARIN SODIUM 3 MG/1
TABLET ORAL
Qty: 270 TABLET | Refills: 1 | Status: SHIPPED | OUTPATIENT
Start: 2017-07-31 | End: 2018-02-22 | Stop reason: SDUPTHER

## 2017-08-01 RX ORDER — MECLIZINE HCL 12.5 MG/1
TABLET ORAL
Qty: 60 TABLET | Refills: 2 | Status: SHIPPED | OUTPATIENT
Start: 2017-08-01

## 2017-08-17 ENCOUNTER — HOSPITAL ENCOUNTER (OUTPATIENT)
Dept: OTHER | Age: 77
Discharge: HOME | End: 2017-08-31
Attending: INTERNAL MEDICINE | Admitting: INTERNAL MEDICINE

## 2017-08-17 ENCOUNTER — ANTI-COAG VISIT (OUTPATIENT)
Dept: PHARMACY | Facility: CLINIC | Age: 77
End: 2017-08-17

## 2017-08-17 LAB — INR BLD: 2

## 2017-08-18 ENCOUNTER — CARE COORDINATION (OUTPATIENT)
Dept: CARE COORDINATION | Age: 77
End: 2017-08-18

## 2017-08-18 VITALS — BODY MASS INDEX: 32.78 KG/M2 | WEIGHT: 197 LBS

## 2017-08-21 ENCOUNTER — NURSE ONLY (OUTPATIENT)
Dept: FAMILY MEDICINE CLINIC | Age: 77
End: 2017-08-21

## 2017-08-21 DIAGNOSIS — E53.8 B12 DEFICIENCY: Primary | ICD-10-CM

## 2017-08-21 PROCEDURE — 96372 THER/PROPH/DIAG INJ SC/IM: CPT | Performed by: FAMILY MEDICINE

## 2017-08-21 RX ORDER — CYANOCOBALAMIN 1000 UG/ML
1000 INJECTION INTRAMUSCULAR; SUBCUTANEOUS ONCE
Status: COMPLETED | OUTPATIENT
Start: 2017-08-21 | End: 2017-08-21

## 2017-08-21 RX ADMIN — CYANOCOBALAMIN 1000 MCG: 1000 INJECTION INTRAMUSCULAR; SUBCUTANEOUS at 11:08

## 2017-08-29 ENCOUNTER — OFFICE VISIT (OUTPATIENT)
Dept: CARDIOLOGY CLINIC | Age: 77
End: 2017-08-29

## 2017-08-29 VITALS
SYSTOLIC BLOOD PRESSURE: 132 MMHG | BODY MASS INDEX: 33.15 KG/M2 | WEIGHT: 199 LBS | DIASTOLIC BLOOD PRESSURE: 50 MMHG | HEART RATE: 85 BPM | OXYGEN SATURATION: 96 % | HEIGHT: 65 IN

## 2017-08-29 DIAGNOSIS — I35.0 NONRHEUMATIC AORTIC VALVE STENOSIS: ICD-10-CM

## 2017-08-29 DIAGNOSIS — I05.0 MITRAL VALVE STENOSIS, UNSPECIFIED ETIOLOGY: ICD-10-CM

## 2017-08-29 DIAGNOSIS — I35.1 AORTIC VALVE REGURGITATION, UNSPECIFIED ETIOLOGY: ICD-10-CM

## 2017-08-29 DIAGNOSIS — I34.0 MITRAL VALVE INSUFFICIENCY, UNSPECIFIED ETIOLOGY: ICD-10-CM

## 2017-08-29 DIAGNOSIS — I50.32 CHRONIC DIASTOLIC CHF (CONGESTIVE HEART FAILURE) (HCC): ICD-10-CM

## 2017-08-29 DIAGNOSIS — E78.2 MIXED HYPERLIPIDEMIA: Chronic | ICD-10-CM

## 2017-08-29 DIAGNOSIS — I48.0 PAROXYSMAL ATRIAL FIBRILLATION (HCC): Primary | Chronic | ICD-10-CM

## 2017-08-29 PROCEDURE — 99214 OFFICE O/P EST MOD 30 MIN: CPT | Performed by: INTERNAL MEDICINE

## 2017-08-29 PROCEDURE — G8417 CALC BMI ABV UP PARAM F/U: HCPCS | Performed by: INTERNAL MEDICINE

## 2017-08-29 PROCEDURE — 1090F PRES/ABSN URINE INCON ASSESS: CPT | Performed by: INTERNAL MEDICINE

## 2017-08-29 PROCEDURE — G8399 PT W/DXA RESULTS DOCUMENT: HCPCS | Performed by: INTERNAL MEDICINE

## 2017-08-29 PROCEDURE — G8598 ASA/ANTIPLAT THER USED: HCPCS | Performed by: INTERNAL MEDICINE

## 2017-08-29 PROCEDURE — G8427 DOCREV CUR MEDS BY ELIG CLIN: HCPCS | Performed by: INTERNAL MEDICINE

## 2017-08-29 PROCEDURE — 1123F ACP DISCUSS/DSCN MKR DOCD: CPT | Performed by: INTERNAL MEDICINE

## 2017-08-29 PROCEDURE — 1036F TOBACCO NON-USER: CPT | Performed by: INTERNAL MEDICINE

## 2017-08-29 PROCEDURE — 4040F PNEUMOC VAC/ADMIN/RCVD: CPT | Performed by: INTERNAL MEDICINE

## 2017-08-30 DIAGNOSIS — I50.32 CHRONIC DIASTOLIC CONGESTIVE HEART FAILURE (HCC): ICD-10-CM

## 2017-08-30 RX ORDER — MONTELUKAST SODIUM 10 MG/1
TABLET ORAL
Qty: 90 TABLET | Refills: 1 | Status: SHIPPED | OUTPATIENT
Start: 2017-08-30 | End: 2018-02-22 | Stop reason: SDUPTHER

## 2017-08-30 RX ORDER — OXYBUTYNIN CHLORIDE 5 MG/1
TABLET ORAL
Qty: 180 TABLET | Refills: 1 | Status: SHIPPED | OUTPATIENT
Start: 2017-08-30

## 2017-08-30 RX ORDER — SPIRONOLACTONE 25 MG/1
TABLET ORAL
Qty: 90 TABLET | Refills: 1 | Status: SHIPPED | OUTPATIENT
Start: 2017-08-30 | End: 2018-02-22 | Stop reason: SDUPTHER

## 2017-08-30 RX ORDER — METOPROLOL TARTRATE 100 MG/1
TABLET ORAL
Qty: 270 TABLET | Refills: 1 | Status: SHIPPED | OUTPATIENT
Start: 2017-08-30 | End: 2018-03-22 | Stop reason: SDUPTHER

## 2017-09-12 ENCOUNTER — TELEPHONE (OUTPATIENT)
Dept: PHARMACY | Facility: CLINIC | Age: 77
End: 2017-09-12

## 2017-09-14 ENCOUNTER — ANTI-COAG VISIT (OUTPATIENT)
Dept: PHARMACY | Facility: CLINIC | Age: 77
End: 2017-09-14

## 2017-09-14 LAB — INR BLD: 2.4

## 2017-09-18 ENCOUNTER — TELEPHONE (OUTPATIENT)
Dept: CARDIOLOGY CLINIC | Age: 77
End: 2017-09-18

## 2017-09-18 ENCOUNTER — CARE COORDINATION (OUTPATIENT)
Dept: CARE COORDINATION | Age: 77
End: 2017-09-18

## 2017-09-18 ASSESSMENT — ENCOUNTER SYMPTOMS: DYSPNEA ASSOCIATED WITH: EXERTION

## 2017-09-19 ENCOUNTER — HOSPITAL ENCOUNTER (OUTPATIENT)
Dept: GENERAL RADIOLOGY | Age: 77
Discharge: HOME | End: 2017-09-19
Attending: NURSE PRACTITIONER | Admitting: NURSE PRACTITIONER

## 2017-09-19 ENCOUNTER — OFFICE VISIT (OUTPATIENT)
Dept: CARDIOLOGY CLINIC | Age: 77
End: 2017-09-19

## 2017-09-19 VITALS
WEIGHT: 201 LBS | HEIGHT: 65 IN | RESPIRATION RATE: 95 BRPM | HEART RATE: 81 BPM | DIASTOLIC BLOOD PRESSURE: 42 MMHG | BODY MASS INDEX: 33.49 KG/M2 | SYSTOLIC BLOOD PRESSURE: 124 MMHG

## 2017-09-19 DIAGNOSIS — Z79.01 CHRONIC ANTICOAGULATION: ICD-10-CM

## 2017-09-19 DIAGNOSIS — I35.0 NONRHEUMATIC AORTIC VALVE STENOSIS: ICD-10-CM

## 2017-09-19 DIAGNOSIS — I48.0 PAROXYSMAL ATRIAL FIBRILLATION (HCC): Primary | ICD-10-CM

## 2017-09-19 DIAGNOSIS — E78.2 MIXED HYPERLIPIDEMIA: ICD-10-CM

## 2017-09-19 DIAGNOSIS — I50.32 CHRONIC DIASTOLIC CHF (CONGESTIVE HEART FAILURE) (HCC): ICD-10-CM

## 2017-09-19 DIAGNOSIS — I10 ESSENTIAL HYPERTENSION: ICD-10-CM

## 2017-09-19 LAB
ANION GAP SERPL CALCULATED.3IONS-SCNC: 16 MMOL/L (ref 3–16)
BASOPHILS ABSOLUTE: 0.1 K/UL (ref 0–0.2)
BASOPHILS RELATIVE PERCENT: 1.2 %
BUN BLDV-MCNC: 29 MG/DL (ref 7–20)
CALCIUM SERPL-MCNC: 9.1 MG/DL (ref 8.3–10.6)
CHLORIDE BLD-SCNC: 91 MMOL/L (ref 99–110)
CO2: 27 MMOL/L (ref 21–32)
CREAT SERPL-MCNC: 1.1 MG/DL (ref 0.6–1.2)
EOSINOPHILS ABSOLUTE: 0.2 K/UL (ref 0–0.6)
EOSINOPHILS RELATIVE PERCENT: 3.4 %
GFR AFRICAN AMERICAN: 58
GFR NON-AFRICAN AMERICAN: 48
GLUCOSE BLD-MCNC: 83 MG/DL (ref 70–99)
HCT VFR BLD CALC: 33.8 % (ref 36–48)
HEMOGLOBIN: 11.2 G/DL (ref 12–16)
LYMPHOCYTES ABSOLUTE: 1.7 K/UL (ref 1–5.1)
LYMPHOCYTES RELATIVE PERCENT: 27.1 %
MCH RBC QN AUTO: 30.6 PG (ref 26–34)
MCHC RBC AUTO-ENTMCNC: 33 G/DL (ref 31–36)
MCV RBC AUTO: 93 FL (ref 80–100)
MONOCYTES ABSOLUTE: 0.9 K/UL (ref 0–1.3)
MONOCYTES RELATIVE PERCENT: 14.3 %
NEUTROPHILS ABSOLUTE: 3.3 K/UL (ref 1.7–7.7)
NEUTROPHILS RELATIVE PERCENT: 54 %
PDW BLD-RTO: 15.3 % (ref 12.4–15.4)
PLATELET # BLD: 362 K/UL (ref 135–450)
PMV BLD AUTO: 8.5 FL (ref 5–10.5)
POTASSIUM SERPL-SCNC: 4.8 MMOL/L (ref 3.5–5.1)
PRO-BNP: 749 PG/ML (ref 0–449)
RBC # BLD: 3.64 M/UL (ref 4–5.2)
SODIUM BLD-SCNC: 134 MMOL/L (ref 136–145)
WBC # BLD: 6.2 K/UL (ref 4–11)

## 2017-09-19 PROCEDURE — G8598 ASA/ANTIPLAT THER USED: HCPCS | Performed by: NURSE PRACTITIONER

## 2017-09-19 PROCEDURE — G8427 DOCREV CUR MEDS BY ELIG CLIN: HCPCS | Performed by: NURSE PRACTITIONER

## 2017-09-19 PROCEDURE — 99214 OFFICE O/P EST MOD 30 MIN: CPT | Performed by: NURSE PRACTITIONER

## 2017-09-19 PROCEDURE — G8417 CALC BMI ABV UP PARAM F/U: HCPCS | Performed by: NURSE PRACTITIONER

## 2017-09-19 PROCEDURE — 4040F PNEUMOC VAC/ADMIN/RCVD: CPT | Performed by: NURSE PRACTITIONER

## 2017-09-19 PROCEDURE — 1090F PRES/ABSN URINE INCON ASSESS: CPT | Performed by: NURSE PRACTITIONER

## 2017-09-19 PROCEDURE — G8399 PT W/DXA RESULTS DOCUMENT: HCPCS | Performed by: NURSE PRACTITIONER

## 2017-09-19 PROCEDURE — 1123F ACP DISCUSS/DSCN MKR DOCD: CPT | Performed by: NURSE PRACTITIONER

## 2017-09-19 PROCEDURE — 1036F TOBACCO NON-USER: CPT | Performed by: NURSE PRACTITIONER

## 2017-09-19 NOTE — PATIENT INSTRUCTIONS
1. No change in heart medicines at this time  2. Have blood work as soon as possible - will call you with results and any changes warranted  3.  Follow up with Tawana Simpson CNP in October as planned

## 2017-09-19 NOTE — PROGRESS NOTES
[address]   Cardiac Evaluation    Primary Care Doctor:  Angela Braswell MD    Chief Complaint   Patient presents with    Edema     bilateral ankles and abdomin, face     Weight Gain     loss with diuretic     Shortness of Breath    Lower Back Pain     Rt sided.  Fatigue    Cough     non productive    Congestive Heart Failure        History of Present Illness:   I had the pleasure of seeing Andreia Hoffman in follow up for dCHF, HTN, AFib on warfarin, HLD as well as valvular heart disease and hx of NSVT. She called yesterday with concern of weight gain and increased shortness of breath. Actually her weight in AM at home have stayed 197 - 200 lbs. She has increased shortness of breath and cough. The cough is productive of clear sputum. She has a hoarse voice but no fevers, + chills. She had been feeling well up until Saturday. There have been some ill contacts. She takes an additional Lasix with increased swelling in legs or abdominal distention. Andreia Hoffman describes symptoms including dyspnea, fatigue, edema, cough but denies chest pain, palpitations, orthopnea, PND, early saiety, syncope. NYHA:   III  ACC/ AHA Stage:    C    Past Medical History:   has a past medical history of Asthma; Asthma; Atrial fibrillation ([address] Cancer ([address] Cellulitis; CHF (congestive heart failure) ([address] Chronic kidney disease; Chronic pain; Constipation; COPD (chronic obstructive pulmonary disease) ([address] DDD (degenerative disc disease), lumbar; Falls frequently; Fibromyalgia; Fibromyalgia; GERD (gastroesophageal reflux disease); Gout; Headaches, cluster; Hodgkin's disease ([address] Hyperlipidemia; Hypertension; IFG (impaired fasting glucose); Mycobacterium avium complex ([address] Neck fracture ([address] Neuralgic migraines; CANDICE (obstructive sleep apnea); Pernicious anemia; Pneumonia; Prediabetes; Sleep disorder;  Thyroid disease; and Unspecified cerebral artery occlusion with cerebral (SYNTHROID) 100 MCG tablet TAKE 1 TABLET EVERY DAY 5/26/17  Yes Betty Agrawal MD   atorvastatin (LIPITOR) 40 MG tablet TAKE 1 TABLET EVERY DAY 5/26/17  Yes Joselyn Cardoso MD   pantoprazole (PROTONIX) 40 MG tablet TAKE 1 TABLET TWICE DAILY 5/26/17  Yes Betty Agrawal MD   vitamin B-1 (THIAMINE) 100 MG tablet TAKE 1 TABLET EVERY DAY 3/22/17  Yes Betty Agrawal MD   polyethylene glycol (GLYCOLAX) powder MIX 1 CAPFUL (17GMS) IN LIQUID AND DRINK EVERY DAY 2/13/17  Yes Betty Agrawal MD   Umeclidinium Bromide 62.5 MCG/INH AEPB 1 inhalation daily 8/9/16  Yes Negar Pagan MD   Biotin 5000 MCG CAPS Take 5,000 mcg by mouth daily 8/8/16  Yes    Coenzyme Q10 (COQ-10) 200 MG CAPS Take 200 mg by mouth daily 8/8/16  Yes    Respiratory Therapy Supplies (NEBULIZER/TUBING/MOUTHPIECE) KIT 1 kit by Does not apply route daily as needed (SOB) Dx J44.9 COPD 7/27/16  Yes Negar Pagan MD   albuterol (PROVENTIL) (2.5 MG/3ML) 0.083% nebulizer solution Take 3 mLs by nebulization every 6 hours as needed for Wheezing Dx COPD J44.9 7/27/16  Yes Negar Pagan MD   Omega-3 Fatty Acids (FISH OIL) 1200 MG CAPS Take 1 capsule by mouth 2 times daily 7/18/16  Yes    melatonin 5 MG TABS tablet Take 1 tablet by mouth daily 7/18/16  Yes    Probiotic Product (PROBIOTIC ACIDOPHILUS) CAPS Take 1 capsule by mouth daily 7/18/16  Yes    traMADol (ULTRAM) 50 MG tablet TAKE 1 TABLET FOUR TIMES DAILY AS NEEDED FOR PAIN 6/10/16  Yes Betty Agrawal MD   budesonide-formoterol (SYMBICORT) 80-4.5 MCG/ACT AERO INHALE 2 PUFFS INTO THE LUNGS TWICE DAILY 5/17/16  Yes Negar Pagan MD   albuterol sulfate HFA (PROVENTIL HFA) 108 (90 BASE) MCG/ACT inhaler Inhale 2 puffs into the lungs every 6 hours as needed for Wheezing 5/17/16  Yes Negar Pagan MD   Calcium Carb-Cholecalciferol (CALCIUM + D3 PO) Take 1,200 mg by mouth daily   Yes Historical Provider, MD   docusate sodium (COLACE) 100 MG capsule Take 100 mg by mouth daily    Yes Historical Provider, MD acetaminophen (TYLENOL 8 HOUR) 650 MG CR tablet Take 650 mg by mouth every 6 hours as needed. Yes Historical Provider, MD        Allergies:  Codeine; Lisinopril; Unasyn [ampicillin-sulbactam sodium]; and Vancomycin     Review of Systems:   · Constitutional: there has been no unanticipated weight loss. There's been no change in energy level, sleep pattern, or activity level. · Eyes: No visual changes or diplopia. No scleral icterus. · ENT: No Headaches, hearing loss or vertigo. No mouth sores or sore throat. · Cardiovascular: Reviewed in HPI  · Respiratory: No cough or wheezing, no sputum production. No hematemesis. · Gastrointestinal: No abdominal pain, appetite loss, blood in stools. No change in bowel or bladder habits. · Genitourinary: No dysuria, trouble voiding, or hematuria. · Musculoskeletal:  No gait disturbance, weakness or joint complaints. · Integumentary: No rash or pruritis. · Neurological: No headache, diplopia, change in muscle strength, numbness or tingling. No change in gait, balance, coordination, mood, affect, memory, mentation, behavior. · Psychiatric: No anxiety, no depression. · Endocrine: No malaise, fatigue or temperature intolerance. No excessive thirst, fluid intake, or urination. No tremor. · Hematologic/Lymphatic: No abnormal bruising or bleeding, blood clots or swollen lymph nodes. · Allergic/Immunologic: No nasal congestion or hives.     Physical Examination:    Vitals:    09/19/17 1349   BP: (!) 124/42   Pulse: 81   Resp: (!) 95   Weight: 201 lb (91.2 kg)   Height: 5' 5\" (1.651 m)        Constitutional and General Appearance: Warm and dry, no apparent distress, normal coloration  HEENT:  Normocephalic, atraumatic  Respiratory:  · Normal excursion and expansion without use of accessory muscles  · Resp Auscultation: Normal breath sounds without dullness  Cardiovascular:  · The apical impulses not displaced  · Heart tones are crisp and normal  · JVP 8-9 cm

## 2017-09-19 NOTE — MR AVS SNAPSHOT
Benadryl prior to infusion. She also received UnaSyn earlier in the day and per nursing notes and night doctor team, it was thought Unasyn may have been the culprit. Possible Red Man syndrome. Tolerated a dose with slow infusion and Benadryl prior to infusion. She also received UnaSyn earlier in the day and per nursing notes and night doctor team, it was thought Unasyn may have been the culprit.       We Ordered/Performed the following           BASIC METABOLIC PANEL     BNP     CBC          Additional Information        Basic Information     Date Of Birth Sex Race Ethnicity Preferred Language    1940 Female White Non-/Non  English      Problem List as of 9/19/2017  Date Reviewed: 9/19/2017                Hx of Hodgkin's disease (Chronic)    Hyperlipidemia (Chronic)    Cerebral artery occlusion with cerebral infarction ([address]    History of CVA (cerebrovascular accident) (Chronic)    Former smoker (Chronic)    Obesity (Chronic)    Back pain    Uncomplicated asthma    Pulmonary MARISSA (mycobacterium avium-intracellulare) infection ([address]    Chronic obstructive pulmonary disease (HCC) (Chronic)    Essential hypertension (Chronic)    B12 deficiency    Anxiety    History of loop recorder    Paroxysmal atrial fibrillation (HCC) (Chronic)    Chronic anticoagulation with Coumadin (Chronic)    Aortic regurgitation    Aortic stenosis    Mitral stenosis    Mitral regurgitation    Leukocytosis    IFG (impaired fasting glucose)    Asthma    Encounter for electronic analysis of reveal event recorder    Hyperglycemia    Chronic diastolic CHF (congestive heart failure) ([address]    Hypothyroidism (Chronic)    GERD (gastroesophageal reflux disease) (Chronic)      Your Goals as of 9/19/2017 at 2:17 PM              8/18/17 7/18/17 6/19/17       Care Coordination    Nutrition Plan   Improving        Notes    I will reduce caloric intake and use the [employer] Food Advisor website for meal planning    Barriers: none [employer] Access Code: [email or web address]  Expires: 11/18/2017  2:17 PM    4. Enter your Social Security Number ([id number]) and Date of Birth (mm/dd/yyyy) as indicated and click Submit. You will be taken to the next sign-up page. 5. Create a [employer] ID. This will be your [employer] login ID and cannot be changed, so think of one that is secure and easy to remember. 6. Create a [employer] password. You can change your password at any time. 7. Enter your Password Reset Question and Answer. This can be used at a later time if you forget your password. 8. Enter your e-mail address. You will receive e-mail notification when new information is available in [address]. 9. Click Sign Up. You can now view your medical record. Additional Information  If you have questions, please contact the physician practice where you receive care. Remember, [employer] is NOT to be used for urgent needs. For medical emergencies, dial 911. For questions regarding your [employer] account call [phone]. If you have a clinical question, please call your doctor's office.

## 2017-09-20 ENCOUNTER — TELEPHONE (OUTPATIENT)
Dept: CARDIOLOGY CLINIC | Age: 77
End: 2017-09-20

## 2017-09-25 ENCOUNTER — OFFICE VISIT (OUTPATIENT)
Dept: FAMILY MEDICINE CLINIC | Age: 77
End: 2017-09-25

## 2017-09-25 VITALS
OXYGEN SATURATION: 98 % | HEART RATE: 68 BPM | WEIGHT: 201 LBS | DIASTOLIC BLOOD PRESSURE: 68 MMHG | BODY MASS INDEX: 33.45 KG/M2 | SYSTOLIC BLOOD PRESSURE: 122 MMHG

## 2017-09-25 DIAGNOSIS — Z23 NEEDS FLU SHOT: ICD-10-CM

## 2017-09-25 DIAGNOSIS — E53.8 VITAMIN B 12 DEFICIENCY: ICD-10-CM

## 2017-09-25 DIAGNOSIS — F32.A ANXIETY AND DEPRESSION: Primary | ICD-10-CM

## 2017-09-25 DIAGNOSIS — M17.0 PRIMARY OSTEOARTHRITIS OF BOTH KNEES: ICD-10-CM

## 2017-09-25 DIAGNOSIS — L82.1 SEBORRHEIC KERATOSES: ICD-10-CM

## 2017-09-25 DIAGNOSIS — L29.9 ITCHING: ICD-10-CM

## 2017-09-25 DIAGNOSIS — F41.9 ANXIETY AND DEPRESSION: Primary | ICD-10-CM

## 2017-09-25 DIAGNOSIS — L81.4 SOLAR LENTIGINOSIS: ICD-10-CM

## 2017-09-25 PROCEDURE — 90662 IIV NO PRSV INCREASED AG IM: CPT | Performed by: FAMILY MEDICINE

## 2017-09-25 PROCEDURE — G0008 ADMIN INFLUENZA VIRUS VAC: HCPCS | Performed by: FAMILY MEDICINE

## 2017-09-25 PROCEDURE — 96372 THER/PROPH/DIAG INJ SC/IM: CPT | Performed by: FAMILY MEDICINE

## 2017-09-25 PROCEDURE — G8427 DOCREV CUR MEDS BY ELIG CLIN: HCPCS | Performed by: FAMILY MEDICINE

## 2017-09-25 PROCEDURE — 1036F TOBACCO NON-USER: CPT | Performed by: FAMILY MEDICINE

## 2017-09-25 PROCEDURE — G8399 PT W/DXA RESULTS DOCUMENT: HCPCS | Performed by: FAMILY MEDICINE

## 2017-09-25 PROCEDURE — G8417 CALC BMI ABV UP PARAM F/U: HCPCS | Performed by: FAMILY MEDICINE

## 2017-09-25 PROCEDURE — 4040F PNEUMOC VAC/ADMIN/RCVD: CPT | Performed by: FAMILY MEDICINE

## 2017-09-25 PROCEDURE — 99214 OFFICE O/P EST MOD 30 MIN: CPT | Performed by: FAMILY MEDICINE

## 2017-09-25 PROCEDURE — G8598 ASA/ANTIPLAT THER USED: HCPCS | Performed by: FAMILY MEDICINE

## 2017-09-25 PROCEDURE — 1090F PRES/ABSN URINE INCON ASSESS: CPT | Performed by: FAMILY MEDICINE

## 2017-09-25 PROCEDURE — 1123F ACP DISCUSS/DSCN MKR DOCD: CPT | Performed by: FAMILY MEDICINE

## 2017-09-25 RX ORDER — CYANOCOBALAMIN 1000 UG/ML
1000 INJECTION INTRAMUSCULAR; SUBCUTANEOUS ONCE
Status: COMPLETED | OUTPATIENT
Start: 2017-09-25 | End: 2017-09-25

## 2017-09-25 RX ORDER — CYANOCOBALAMIN 1000 UG/ML
1000 INJECTION INTRAMUSCULAR; SUBCUTANEOUS ONCE
Qty: 1 ML | Refills: 0 | Status: SHIPPED | OUTPATIENT
Start: 2017-09-25 | End: 2017-09-25 | Stop reason: CLARIF

## 2017-09-25 RX ADMIN — CYANOCOBALAMIN 1000 MCG: 1000 INJECTION INTRAMUSCULAR; SUBCUTANEOUS at 11:31

## 2017-10-03 ENCOUNTER — TELEPHONE (OUTPATIENT)
Dept: FAMILY MEDICINE CLINIC | Age: 77
End: 2017-10-03

## 2017-10-03 NOTE — TELEPHONE ENCOUNTER
Here's my plan. Instead of injections of B12, let's have her take otc B12 1000 mcg 1qd. At her Dec appt, I'll radha B12, and if low, we'll have proof that she needs injections instead of pills and we can then appeal to her insur about continuing the B12 shots if necessary.   Thx.

## 2017-10-12 ENCOUNTER — ANTI-COAG VISIT (OUTPATIENT)
Dept: PHARMACY | Facility: CLINIC | Age: 77
End: 2017-10-12

## 2017-10-12 DIAGNOSIS — Z79.01 CHRONIC ANTICOAGULATION: Primary | ICD-10-CM

## 2017-10-12 DIAGNOSIS — I48.0 PAROXYSMAL ATRIAL FIBRILLATION (HCC): ICD-10-CM

## 2017-10-12 LAB — INR BLD: 2.6

## 2017-10-12 NOTE — PROGRESS NOTES
Ms. Antonino Moe is here for management of anticoagulation for AFib. PMH also significant for AFlutter, chronic CHF, LFT elevation, HTN, hypothyroid, respiratory failure and GERD. She has a hx of alcohol dependence- but she hasnt had a drink since 12/24/12. Per Dr. Issac Souza note, her CHADSvasc2 score is 4. Her  Chance Mar is a previous patient of ours. Pt verified dosing regimen. Pt denies s/s bleeding/swelling/SOB. No BRBPR. No melena. No changes in Rx/OTC/Herbals. No dietary changes. Denies EToH and tobacco use. INR 2.6 is within acceptable therapeutic range of 2-3. Recommend to continue 7.5mg Sun/Wed and 6 mg all other days. Pt has 3 mg tablets. Will continue to monitor and check INR in 4-5 weeks.  .  Return to clinic: 11/16 Alvin WileyD, BCPS 10/12/2017 9:15 AM

## 2017-10-24 ENCOUNTER — HOSPITAL ENCOUNTER (OUTPATIENT)
Dept: GENERAL RADIOLOGY | Age: 77
Discharge: HOME | End: 2017-10-24
Attending: INTERNAL MEDICINE | Admitting: INTERNAL MEDICINE

## 2017-10-24 DIAGNOSIS — R06.02 SOB (SHORTNESS OF BREATH): ICD-10-CM

## 2017-10-24 DIAGNOSIS — I10 ESSENTIAL HYPERTENSION: Chronic | ICD-10-CM

## 2017-10-24 DIAGNOSIS — I48.0 PAROXYSMAL ATRIAL FIBRILLATION (HCC): Chronic | ICD-10-CM

## 2017-10-24 LAB
ANION GAP SERPL CALCULATED.3IONS-SCNC: 10 MMOL/L (ref 3–16)
BUN BLDV-MCNC: 26 MG/DL (ref 7–20)
CALCIUM SERPL-MCNC: 8.7 MG/DL (ref 8.3–10.6)
CHLORIDE BLD-SCNC: 94 MMOL/L (ref 99–110)
CO2: 33 MMOL/L (ref 21–32)
CREAT SERPL-MCNC: 1 MG/DL (ref 0.6–1.2)
GFR AFRICAN AMERICAN: >60
GFR NON-AFRICAN AMERICAN: 53
GLUCOSE BLD-MCNC: 101 MG/DL (ref 70–99)
POTASSIUM SERPL-SCNC: 4.6 MMOL/L (ref 3.5–5.1)
PRO-BNP: 824 PG/ML (ref 0–449)
SODIUM BLD-SCNC: 137 MMOL/L (ref 136–145)

## 2017-10-25 ENCOUNTER — OFFICE VISIT (OUTPATIENT)
Dept: CARDIOLOGY CLINIC | Age: 77
End: 2017-10-25

## 2017-10-25 VITALS
HEART RATE: 95 BPM | DIASTOLIC BLOOD PRESSURE: 44 MMHG | WEIGHT: 203 LBS | OXYGEN SATURATION: 98 % | BODY MASS INDEX: 33.82 KG/M2 | HEIGHT: 65 IN | SYSTOLIC BLOOD PRESSURE: 100 MMHG

## 2017-10-25 DIAGNOSIS — I50.32 CHRONIC DIASTOLIC CHF (CONGESTIVE HEART FAILURE) (HCC): Primary | ICD-10-CM

## 2017-10-25 DIAGNOSIS — D64.9 ANEMIA, UNSPECIFIED TYPE: ICD-10-CM

## 2017-10-25 DIAGNOSIS — R53.83 FATIGUE, UNSPECIFIED TYPE: ICD-10-CM

## 2017-10-25 DIAGNOSIS — R06.02 SHORTNESS OF BREATH: ICD-10-CM

## 2017-10-25 PROCEDURE — G8399 PT W/DXA RESULTS DOCUMENT: HCPCS | Performed by: NURSE PRACTITIONER

## 2017-10-25 PROCEDURE — G8598 ASA/ANTIPLAT THER USED: HCPCS | Performed by: NURSE PRACTITIONER

## 2017-10-25 PROCEDURE — 4040F PNEUMOC VAC/ADMIN/RCVD: CPT | Performed by: NURSE PRACTITIONER

## 2017-10-25 PROCEDURE — 99214 OFFICE O/P EST MOD 30 MIN: CPT | Performed by: NURSE PRACTITIONER

## 2017-10-25 PROCEDURE — 1123F ACP DISCUSS/DSCN MKR DOCD: CPT | Performed by: NURSE PRACTITIONER

## 2017-10-25 PROCEDURE — G8427 DOCREV CUR MEDS BY ELIG CLIN: HCPCS | Performed by: NURSE PRACTITIONER

## 2017-10-25 PROCEDURE — G8484 FLU IMMUNIZE NO ADMIN: HCPCS | Performed by: NURSE PRACTITIONER

## 2017-10-25 PROCEDURE — 1090F PRES/ABSN URINE INCON ASSESS: CPT | Performed by: NURSE PRACTITIONER

## 2017-10-25 PROCEDURE — 1036F TOBACCO NON-USER: CPT | Performed by: NURSE PRACTITIONER

## 2017-10-25 PROCEDURE — G8417 CALC BMI ABV UP PARAM F/U: HCPCS | Performed by: NURSE PRACTITIONER

## 2017-10-25 NOTE — LETTER
[address] [place] [address] Cardiology - [address] [place] [address],[phone] [id number]  Phone: [phone]  Fax: [phone]    Zev Reyes        October 25, 2017     [facility]    Patient: Sridevi Denise  MR Number: [id number]  YOB: 1940  Date of Visit: 10/25/2017    Dear Dr. Manasa Martinez:    Thank you for allowing me to participate in the care of Bonifacio De Souza. Below are the relevant portions of my assessment and plan of care. Visit Diagnosis:    1. Chronic diastolic CHF (congestive heart failure) ([facility]    2. Anemia, unspecified type    3. Fatigue, unspecified type    4. Shortness of breath       Plan:   1. Continue daily weights  2. Check Iron studies with next blood draw  3. Check BMP/BNP in about 8-10 weeks  4. No changes to medications at this time. Okay to take an extra dose of lasix as needed for weight gain >200lbs   5. Monitor blood pressures at home; call if B/P running low <100/40 consistently  6. Follow up 3 months or sooner if needed      If you have questions, please do not hesitate to call me. I look forward to following Naga Back along with you.     Sincerely,        Jim Perea CNP

## 2017-10-25 NOTE — PROGRESS NOTES
[address]   Cardiac Follow-up    Primary Care Doctor:  Rob Duran MD    Chief Complaint   Patient presents with    Follow-up    Fatigue    Edema     holding water        History of Present Illness:   I had the pleasure of seeing Val Houston in follow up for  dCHF, MS, AS, PAF on coumadin. Since last visit,  She continues to feel more palpitations especially when she is exhausted. Taking lasix 1 tab BID and occaionally will take an extra tab in the evening, about once every other week if her weight is up to 200lbs or if she is bloating/holding water. Eating well, eating out often. Complains of fatigue, tired. Many stressors at home when the recent move. She continues with dyspnea with exertion. Uses rescue inhaler when she has to walk long distances. Dyspnea improves with rest. She feels depressed and tired. She is thinking about counseling again with [facility]. Val Houston describes symptoms including dyspnea, palpitations, fatigue, edema but denies chest pain, orthopnea, exertional chest pressure/discomfort, syncope. NYHA:   III  ACC/ AHA Stage:    C    Past Medical History:   has a past medical history of Asthma; Asthma; Atrial fibrillation ([address] Cancer ([address] Cellulitis; CHF (congestive heart failure) ([address] Chronic kidney disease; Chronic pain; Constipation; COPD (chronic obstructive pulmonary disease) ([address] DDD (degenerative disc disease), lumbar; Falls frequently; Fibromyalgia; Fibromyalgia; GERD (gastroesophageal reflux disease); Gout; Headaches, cluster; Hodgkin's disease ([address] Hyperlipidemia; Hypertension; IFG (impaired fasting glucose); Mycobacterium avium complex ([address] Neck fracture ([address] Neuralgic migraines; CANDICE (obstructive sleep apnea); Pernicious anemia; Pneumonia; Prediabetes; Sleep disorder; Thyroid disease; and Unspecified cerebral artery occlusion with cerebral infarction.   Surgical History:   has a past surgical history that includes Neck surgery (2010); abnormalities  are seen. Diastolic filling parameters suggests diastolic function ( Mitral A   velocity  > 1.5 m/sec in presence of mitral stenosis. Mitral annular calcification is present. The maximum mitral valve pressure gradient is 12 mmHg and the mean   pressure  gradient is 7mmHg. Mild mitral stenosis. Moderate mitral regurgitation. The aortic valve is thickened/calcified with decreased leaflet mobility. The aortic valve area is calculated at 24 cm2 with a maximum pressure  gradient of 14 mmHg and a mean pressure gradient of 14 mmHg. This is c/w  mild aortic stenosis. Moderate aortic regurgitation. Moderate tricuspid regurgitation. Systolic pulmonary artery pressure (SPAP) is normal and estimated at 32   mmHg  (RA pressure 3 mmHg). Mild pulmonic valve regurgitation. There is a moderate circumferential pericardial effusion without   Tamponade physiology    ECHO:2/13: Echocardiogram with Doppler shows mild left ventricular  hypertrophy with ejection fraction of 55%. Stage 2 diastolic  dysfunction of the left ventricle is present. Mild mitral  regurgitation and moderate aortic regurgitation is present. There  is mild pulmonary hypertension      Pertinent Problems:  · Chronic diastolic heart failure. NYHA class III  · PAF on coumadin  · Mild AS  · Fatigue  · HLD    Visit Diagnosis:    1. Chronic diastolic CHF (congestive heart failure) ([address]    2. Anemia, unspecified type    3. Fatigue, unspecified type    4. Shortness of breath      Plan:   1. Continue daily weights  2. Check Iron studies with next blood draw  3. Check BMP/BNP in about 8-10 weeks  4. No changes to medications at this time. Okay to take an extra dose of lasix as needed for weight gain >200lbs   5. Monitor blood pressures at home; call if B/P running low <100/40 consistently  6. Follow up 3 months or sooner if needed      QUALITY MEASURES  1. Tobacco Cessation Counseling: NA  2. Retake of BP if >140/90:   NA  3.  Documentation to

## 2017-10-26 ENCOUNTER — CARE COORDINATION (OUTPATIENT)
Dept: CARE COORDINATION | Age: 77
End: 2017-10-26

## 2017-10-26 NOTE — CARE COORDINATION
Advisor website for meal planning    Barriers: none  Plan for overcoming my barriers: N/A  Confidence: 7/10  Anticipated Goal Completion Date: 10/18/2017           Diet     Reduce sodium intake to 6713-3669 milligrams per day   On track (10/26/2017)       Lifestyle     Keep diary of weights/ log book of weights   On track (10/26/2017)             Care Coordination Goal: Stable Weight   Goal:  Stable weight and no increase in daily weights  Barriers to success: impairment:  physical  Plan for overcoming my barriers: improve physical activity as tolerated     Confidence: 5/10         Weigh self daily each morning    On track (10/26/2017)          Prior to Admission medications    Medication Sig Start Date End Date Taking?  Authorizing Provider   metoprolol (LOPRESSOR) 100 MG tablet TAKE 1 TABLET EVERY MORNING  AND TAKE 2 TABLETS EVERY DAY AT BEDTIME 8/30/17   Joselyn Cardoso MD   montelukast (SINGULAIR) 10 MG tablet TAKE 1 TABLET NIGHTLY 8/30/17   Effie Kowalski MD   spironolactone (ALDACTONE) 25 MG tablet TAKE 1 TABLET EVERY DAY 8/30/17   Effie Kowalski MD   oxybutynin (DITROPAN) 5 MG tablet TAKE 1 TABLET TWICE DAILY 8/30/17   Effie Kowalski MD   meclizine (ANTIVERT) 12.5 MG tablet TAKE ONE TO TWO TABLETS BY MOUTH THREE TIMES A DAY AS NEEDED DIZZINESS AND NAUSEA 8/1/17   Effie Kowalski MD   warfarin (COUMADIN) 3 MG tablet TAKE 3 TABLETS EVERY DAY 7/31/17   Vera Greene NP   folic acid (FOLVITE) 1 MG tablet TAKE 1 TABLET EVERY DAY 7/17/17   Effie Kowalski MD   furosemide (LASIX) 40 MG tablet Take 1 tablet by mouth 2 times daily TAKE 1 TABLET BID 6/27/17   Chang Grove MD   venlafaxine (EFFEXOR XR) 150 MG extended release capsule TAKE 1 CAPSULE EVERY DAY 5/26/17   Effie Kowalski MD   levothyroxine (SYNTHROID) 100 MCG tablet TAKE 1 TABLET EVERY DAY 5/26/17   Effie Kowalski MD   atorvastatin (LIPITOR) 40 MG tablet TAKE 1 TABLET EVERY DAY 5/26/17   Effie Kowalski MD   pantoprazole (PROTONIX) 40 MG tablet ANTICOAGULATION CLINIC [facility] ANTICOAG None   11/22/2017 9:00 AM SCHEDULE, [facility]   12/14/2017 9:30 AM MD ADARSH Santillan [facility]   12/28/2017 10:00 AM Lovely Moss MD [facility]   1/24/2018 9:00 AM Heather Peña, ELEANOR Yeh [facility]         Sanju West, RN   Care Coordinator  [phone]

## 2017-11-01 ENCOUNTER — HOSPITAL ENCOUNTER (OUTPATIENT)
Dept: OTHER | Age: 77
Discharge: HOME | End: 2017-11-30
Attending: INTERNAL MEDICINE | Admitting: INTERNAL MEDICINE

## 2017-11-08 ENCOUNTER — OFFICE VISIT (OUTPATIENT)
Dept: PULMONOLOGY | Age: 77
End: 2017-11-08

## 2017-11-08 VITALS
HEIGHT: 65 IN | DIASTOLIC BLOOD PRESSURE: 60 MMHG | OXYGEN SATURATION: 98 % | RESPIRATION RATE: 20 BRPM | TEMPERATURE: 97 F | SYSTOLIC BLOOD PRESSURE: 120 MMHG | WEIGHT: 205 LBS | BODY MASS INDEX: 34.16 KG/M2

## 2017-11-08 DIAGNOSIS — Z78.9 INTOLERANCE OF CONTINUOUS POSITIVE AIRWAY PRESSURE (CPAP) VENTILATION: ICD-10-CM

## 2017-11-08 DIAGNOSIS — Z72.821 POOR SLEEP HYGIENE: ICD-10-CM

## 2017-11-08 DIAGNOSIS — E66.9 OBESITY (BMI 30-39.9): ICD-10-CM

## 2017-11-08 DIAGNOSIS — G47.33 OSA (OBSTRUCTIVE SLEEP APNEA): Primary | ICD-10-CM

## 2017-11-08 PROCEDURE — G8427 DOCREV CUR MEDS BY ELIG CLIN: HCPCS | Performed by: NURSE PRACTITIONER

## 2017-11-08 PROCEDURE — G8484 FLU IMMUNIZE NO ADMIN: HCPCS | Performed by: NURSE PRACTITIONER

## 2017-11-08 PROCEDURE — G8417 CALC BMI ABV UP PARAM F/U: HCPCS | Performed by: NURSE PRACTITIONER

## 2017-11-08 PROCEDURE — 1036F TOBACCO NON-USER: CPT | Performed by: NURSE PRACTITIONER

## 2017-11-08 PROCEDURE — G8598 ASA/ANTIPLAT THER USED: HCPCS | Performed by: NURSE PRACTITIONER

## 2017-11-08 PROCEDURE — G8399 PT W/DXA RESULTS DOCUMENT: HCPCS | Performed by: NURSE PRACTITIONER

## 2017-11-08 PROCEDURE — 1123F ACP DISCUSS/DSCN MKR DOCD: CPT | Performed by: NURSE PRACTITIONER

## 2017-11-08 PROCEDURE — 99213 OFFICE O/P EST LOW 20 MIN: CPT | Performed by: NURSE PRACTITIONER

## 2017-11-08 PROCEDURE — 4040F PNEUMOC VAC/ADMIN/RCVD: CPT | Performed by: NURSE PRACTITIONER

## 2017-11-08 PROCEDURE — 1090F PRES/ABSN URINE INCON ASSESS: CPT | Performed by: NURSE PRACTITIONER

## 2017-11-08 ASSESSMENT — SLEEP AND FATIGUE QUESTIONNAIRES
HOW LIKELY ARE YOU TO NOD OFF OR FALL ASLEEP WHILE SITTING AND TALKING TO SOMEONE: 0
HOW LIKELY ARE YOU TO NOD OFF OR FALL ASLEEP WHILE SITTING INACTIVE IN A PUBLIC PLACE: 0
HOW LIKELY ARE YOU TO NOD OFF OR FALL ASLEEP WHILE SITTING AND READING: 1
NECK CIRCUMFERENCE (INCHES): 14
HOW LIKELY ARE YOU TO NOD OFF OR FALL ASLEEP WHEN YOU ARE A PASSENGER IN A CAR FOR AN HOUR WITHOUT A BREAK: 3
ESS TOTAL SCORE: 9
HOW LIKELY ARE YOU TO NOD OFF OR FALL ASLEEP IN A CAR, WHILE STOPPED FOR A FEW MINUTES IN TRAFFIC: 0
HOW LIKELY ARE YOU TO NOD OFF OR FALL ASLEEP WHILE SITTING QUIETLY AFTER LUNCH WITHOUT ALCOHOL: 0
HOW LIKELY ARE YOU TO NOD OFF OR FALL ASLEEP WHILE WATCHING TV: 2
HOW LIKELY ARE YOU TO NOD OFF OR FALL ASLEEP WHILE LYING DOWN TO REST IN THE AFTERNOON WHEN CIRCUMSTANCES PERMIT: 3

## 2017-11-08 NOTE — PATIENT INSTRUCTIONS
is a stimulant effect and you will experience fragmented sleep. 6- Take a hot bath 90 minutes before bedtime:  A hot bath will raise your body temperature, but it is the drop in body temperature that may leave you feeling sleepy  7- Develop sleep rituals: it is important to give your body cues that it is time to slow down and sleep. Listen to relaxing music, read something soothing for 15 minutes, have a cup of caffeine free tea, or do relaxation exercises such as yoga or deep breathing help relieve anxiety and reduce muscle tension. 8- Fix a bedtime and an awakening time: Even on weekends! When your sleep cycle has a regular rhythm, you will feel better. 9- Sleep only when sleepy: This reduces the time you are awake in bed. 10- Get into your favorite sleeping position: If you can't fall asleep within 15-30 minutes, get up and do something boring until you feel sleepy. Sit quietly in the dark or read the warranty on your refrigerator. Don't expose yourself to bright light while you are up, it gives cues to your brain that it is time to wake up. 11- Only use your bed for sleeping: Dont use the bed as an office, workroom or recreation room. Let your body \"know\" that the bed is associated with sleeping  12- Use comfortable bedding. Uncomfortable bedding can prevent good sleep. Evaluate whether or not this is a source of your problem, and make appropriate changes. 13- Make sure your bed and bedroom are quiet and comfortable: A hot room can be uncomfortable. A cooler room, along with enough blankets to stay warm is recommended. Get a blackout shade or wear a slumber mask and wear earplugs or get a \"white noise\" machine for light and noise distractions. 14- Use sunlight to set your biological clock: When you get up in the morning, go outside and turn your face to the sun for 15 minutes. 13- Dont take your worries to bed: Leave worries about job, school, daily life, etc., behind when you go to bed.  Some

## 2017-11-08 NOTE — PROGRESS NOTES
frequently     Fibromyalgia     Fibromyalgia     GERD (gastroesophageal reflux disease)     Gout     Headaches, cluster     Hodgkin's disease [facility]     Hyperlipidemia     Hypertension     IFG (impaired fasting glucose)     [facility]ium complex [facility] 08/23/2016    bronch 8/5/16 - smear negative, grew 8/23/16, Not TB    Neck fracture ([facility]     Neuralgic migraines     CANDICE (obstructive sleep apnea)     cannot tolerate cpap    Pernicious anemia     Pneumonia     Prediabetes     Sleep disorder     Thyroid disease     goiter per CT cervical spine    Unspecified cerebral artery occlusion with cerebral infarction        Past Surgical History:        Procedure Laterality Date    BRONCHOSCOPY      DIAGNOSTIC CARDIAC CATH LAB PROCEDURE      FINGER SURGERY  1/9/13    incision and drainage left index finger    HYSTERECTOMY      NECK SURGERY  2010    Two metal pins    NECK SURGERY  1980s    thyroid nodule    OTHER SURGICAL HISTORY  2012    loop placed left side of heart    RECTAL SURGERY  2002    Anal fissure    SPLENECTOMY      TUBAL LIGATION      TUMOR REMOVAL      lipoma, removed as child       Allergies:  is allergic to codeine; lisinopril; unasyn [ampicillin-sulbactam sodium]; and vancomycin. Social History:    TOBACCO:   reports that she quit smoking about 31 years ago. Her smoking use included Cigarettes. She has a 22.50 pack-year smoking history. She has never used smokeless tobacco.  ETOH:   reports that she does not drink alcohol.     Family History:       Problem Relation Age of Onset    Diabetes Mother     Heart Disease Father     Heart Disease Sister     Cancer Brother     Diabetes Sister     Cancer Brother     Cancer Brother     Cancer Son      NMSC       Current Medications:    Current Outpatient Prescriptions:     metoprolol (LOPRESSOR) 100 MG tablet, TAKE 1 TABLET EVERY MORNING  AND TAKE 2 TABLETS EVERY DAY AT BEDTIME, Disp: 270 tablet, Rfl: 1    montelukast mouth 2 times daily, Disp: 1 capsule, Rfl: 0    melatonin 5 MG TABS tablet, Take 1 tablet by mouth daily, Disp: 1 tablet, Rfl: 0    Probiotic Product (PROBIOTIC ACIDOPHILUS) CAPS, Take 1 capsule by mouth daily, Disp: 1 capsule, Rfl: 0    traMADol (ULTRAM) 50 MG tablet, TAKE 1 TABLET FOUR TIMES DAILY AS NEEDED FOR PAIN, Disp: 360 tablet, Rfl: 1    budesonide-formoterol (SYMBICORT) 80-4.5 MCG/ACT AERO, INHALE 2 PUFFS INTO THE LUNGS TWICE DAILY, Disp: 3 Inhaler, Rfl: 5    albuterol sulfate HFA (PROVENTIL HFA) 108 (90 BASE) MCG/ACT inhaler, Inhale 2 puffs into the lungs every 6 hours as needed for Wheezing, Disp: 3 Inhaler, Rfl: 5    Calcium Carb-Cholecalciferol (CALCIUM + D3 PO), Take 1,200 mg by mouth daily, Disp: , Rfl:     docusate sodium (COLACE) 100 MG capsule, Take 100 mg by mouth daily , Disp: , Rfl:     acetaminophen (TYLENOL 8 HOUR) 650 MG CR tablet, Take 650 mg by mouth every 6 hours as needed. , Disp: , Rfl:       Objective:   PHYSICAL EXAM:    /60 (Site: Right Arm, Position: Sitting)   Temp 97 °F (36.1 °C) (Oral)   Resp 20   Ht 5' 5\" (1.651 m)   Wt 205 lb (93 kg)   SpO2 98% Comment: ra  BMI 34.11 kg/m²     Physical exam:  Gen: No acute distress. Obese. BMI of 34.11  Eyes: PERRL. No sclera icterus. No conjunctival injection. ENT: No discharge. Pharynx clear. Mallampati class III. Neck: Trachea midline. No obvious mass. Neck circumference 14\"  Resp: No accessory muscle use. No crackles. No wheezes. No rhonchi. CV: Regular rate. Regular rhythm. +murmur, no rub. Skin: Warm and dry. M/S: No cyanosis. No obvious joint deformity. Neuro: Awake. Alert. Moves all four extremities. Psych: Oriented x 3. No anxiety.        DATA reviewed by me:   5/16/16 PSG AHI 5/REM AHI 12.3, low SPO2 84%  6/15/16 CPAP titration improved sleep-related breathing with CPAP, recommendation CPAP 8 cm H2O    Assessment:      · Mild CANDICE- not using CPAP  · A. fib, HTN, CHF-followed by cardiology  · Moderate

## 2017-11-16 ENCOUNTER — ANTI-COAG VISIT (OUTPATIENT)
Dept: PHARMACY | Facility: CLINIC | Age: 77
End: 2017-11-16

## 2017-11-16 ENCOUNTER — HOSPITAL ENCOUNTER (OUTPATIENT)
Dept: GENERAL RADIOLOGY | Age: 77
Discharge: HOME | End: 2017-11-16
Attending: NURSE PRACTITIONER | Admitting: NURSE PRACTITIONER

## 2017-11-16 DIAGNOSIS — I50.32 CHRONIC DIASTOLIC CHF (CONGESTIVE HEART FAILURE) (HCC): ICD-10-CM

## 2017-11-16 DIAGNOSIS — R53.83 FATIGUE, UNSPECIFIED TYPE: ICD-10-CM

## 2017-11-16 DIAGNOSIS — D64.9 ANEMIA, UNSPECIFIED TYPE: ICD-10-CM

## 2017-11-16 DIAGNOSIS — R06.02 SHORTNESS OF BREATH: ICD-10-CM

## 2017-11-16 LAB
INR BLD: 3.1
IRON SATURATION: 13 % (ref 15–50)
IRON: 57 UG/DL (ref 37–145)
TOTAL IRON BINDING CAPACITY: 437 UG/DL (ref 260–445)

## 2017-11-16 NOTE — PROGRESS NOTES
Ms. Elif Kerr is here for management of anticoagulation for AFib. PMH also significant for AFlutter, chronic CHF, LFT elevation, HTN, hypothyroid, respiratory failure and GERD. She has a hx of alcohol dependence- but she hasnt had a drink since 12/24/12. Per Dr. Ingram More note, her CHADSvasc2 score is 4. Her  Adrián Branham is a previous patient of ours. Pt verified dosing regimen. Pt denies s/s bleeding/swelling/SOB. No BRBPR. No melena. No changes in Rx/OTC/Herbals. No dietary changes. Denies EToH and tobacco use. Encouraged patient to eat more greens in diet. INR 3.1 is within acceptable therapeutic range of 2-3. Recommend to continue 7.5mg Sun/Wed and 6 mg all other days. Has been stable on dose and increase in greens should stabilize INR. Pt has 3 mg tablets. Will continue to monitor and check INR in 4-5 weeks.  .  Return to clinic: 12/15 Anne SCOTT Candidate 2018

## 2017-11-20 ENCOUNTER — TELEPHONE (OUTPATIENT)
Dept: CARDIOLOGY CLINIC | Age: 77
End: 2017-11-20

## 2017-12-01 ENCOUNTER — HOSPITAL ENCOUNTER (OUTPATIENT)
Dept: OTHER | Age: 77
Discharge: HOME | End: 2017-12-31
Attending: INTERNAL MEDICINE | Admitting: INTERNAL MEDICINE

## 2017-12-13 ENCOUNTER — CARE COORDINATION (OUTPATIENT)
Dept: CARE COORDINATION | Age: 77
End: 2017-12-13

## 2017-12-13 NOTE — CARE COORDINATION
MG tablet TAKE 3 TABLETS EVERY DAY 7/31/17   Antonia White NP   folic acid (FOLVITE) 1 MG tablet TAKE 1 TABLET EVERY DAY 7/17/17   Latesha Riggins MD   furosemide (LASIX) 40 MG tablet Take 1 tablet by mouth 2 times daily TAKE 1 TABLET BID 6/27/17   Juanjose Briceño MD   venlafaxine (EFFEXOR XR) 150 MG extended release capsule TAKE 1 CAPSULE EVERY DAY 5/26/17   Latesha Riggins MD   levothyroxine (SYNTHROID) 100 MCG tablet TAKE 1 TABLET EVERY DAY 5/26/17   Latesha Riggins MD   atorvastatin (LIPITOR) 40 MG tablet TAKE 1 TABLET EVERY DAY 5/26/17   Latesha Riggins MD   pantoprazole (PROTONIX) 40 MG tablet TAKE 1 TABLET TWICE DAILY 5/26/17   Latesha Riggins MD   polyethylene glycol (GLYCOLAX) powder MIX 1 CAPFUL (17GMS) IN LIQUID AND DRINK EVERY DAY 2/13/17   Latesha Riggins MD   Umeclidinium Bromide 62.5 MCG/INH AEPB 1 inhalation daily 8/9/16   Lakshmi Quiros MD   Biotin 5000 MCG CAPS Take 5,000 mcg by mouth daily 8/8/16      Coenzyme Q10 (COQ-10) 200 MG CAPS Take 200 mg by mouth daily 8/8/16      Respiratory Therapy Supplies (NEBULIZER/TUBING/MOUTHPIECE) KIT 1 kit by Does not apply route daily as needed (SOB) Dx J44.9 COPD 7/27/16   Lakshmi Quiros MD   albuterol (PROVENTIL) (2.5 MG/3ML) 0.083% nebulizer solution Take 3 mLs by nebulization every 6 hours as needed for Wheezing Dx COPD J44.9 7/27/16   Lkashmi Quiros MD   Omega-3 Fatty Acids (FISH OIL) 1200 MG CAPS Take 1 capsule by mouth 2 times daily 7/18/16      melatonin 5 MG TABS tablet Take 1 tablet by mouth daily 7/18/16      Probiotic Product (PROBIOTIC ACIDOPHILUS) CAPS Take 1 capsule by mouth daily 7/18/16      traMADol (ULTRAM) 50 MG tablet TAKE 1 TABLET FOUR TIMES DAILY AS NEEDED FOR PAIN 6/10/16   Latesha Riggins MD   budesonide-formoterol (SYMBICORT) 80-4.5 MCG/ACT AERO INHALE 2 PUFFS INTO THE LUNGS TWICE DAILY 5/17/16   Lakshmi Quiros MD   albuterol sulfate HFA (PROVENTIL HFA) 108 (90 BASE) MCG/ACT inhaler Inhale 2 puffs into the lungs every 6 hours as

## 2017-12-14 ENCOUNTER — OFFICE VISIT (OUTPATIENT)
Dept: PULMONOLOGY | Age: 77
End: 2017-12-14

## 2017-12-14 VITALS
WEIGHT: 204 LBS | HEART RATE: 70 BPM | RESPIRATION RATE: 22 BRPM | SYSTOLIC BLOOD PRESSURE: 118 MMHG | DIASTOLIC BLOOD PRESSURE: 74 MMHG | HEIGHT: 65 IN | OXYGEN SATURATION: 97 % | BODY MASS INDEX: 33.99 KG/M2 | TEMPERATURE: 97.6 F

## 2017-12-14 DIAGNOSIS — A31.0 PULMONARY MAI (MYCOBACTERIUM AVIUM-INTRACELLULARE) INFECTION (HCC): ICD-10-CM

## 2017-12-14 DIAGNOSIS — R93.89 ABNORMAL CHEST CT: ICD-10-CM

## 2017-12-14 DIAGNOSIS — J45.909 UNCOMPLICATED ASTHMA, UNSPECIFIED ASTHMA SEVERITY, UNSPECIFIED WHETHER PERSISTENT: ICD-10-CM

## 2017-12-14 DIAGNOSIS — J44.9 CHRONIC OBSTRUCTIVE PULMONARY DISEASE, UNSPECIFIED COPD TYPE (HCC): Primary | ICD-10-CM

## 2017-12-14 PROCEDURE — 1090F PRES/ABSN URINE INCON ASSESS: CPT | Performed by: INTERNAL MEDICINE

## 2017-12-14 PROCEDURE — G8417 CALC BMI ABV UP PARAM F/U: HCPCS | Performed by: INTERNAL MEDICINE

## 2017-12-14 PROCEDURE — 1036F TOBACCO NON-USER: CPT | Performed by: INTERNAL MEDICINE

## 2017-12-14 PROCEDURE — G8926 SPIRO NO PERF OR DOC: HCPCS | Performed by: INTERNAL MEDICINE

## 2017-12-14 PROCEDURE — G8484 FLU IMMUNIZE NO ADMIN: HCPCS | Performed by: INTERNAL MEDICINE

## 2017-12-14 PROCEDURE — 3023F SPIROM DOC REV: CPT | Performed by: INTERNAL MEDICINE

## 2017-12-14 PROCEDURE — G8598 ASA/ANTIPLAT THER USED: HCPCS | Performed by: INTERNAL MEDICINE

## 2017-12-14 PROCEDURE — 1123F ACP DISCUSS/DSCN MKR DOCD: CPT | Performed by: INTERNAL MEDICINE

## 2017-12-14 PROCEDURE — G8399 PT W/DXA RESULTS DOCUMENT: HCPCS | Performed by: INTERNAL MEDICINE

## 2017-12-14 PROCEDURE — G8427 DOCREV CUR MEDS BY ELIG CLIN: HCPCS | Performed by: INTERNAL MEDICINE

## 2017-12-14 PROCEDURE — 4040F PNEUMOC VAC/ADMIN/RCVD: CPT | Performed by: INTERNAL MEDICINE

## 2017-12-14 PROCEDURE — 99214 OFFICE O/P EST MOD 30 MIN: CPT | Performed by: INTERNAL MEDICINE

## 2017-12-14 NOTE — PROGRESS NOTES
[facility]    Outpatient Follow Up Note    CC: COPD  Consulting provider: Rosa Maria Goncalves MD    Interval History: 76 y.o. female Her endurance is decreased some. No new respiratory symptoms. No wheeze or coughing. Initial HPI: here to establish care for chronic asthma. In last month reports good control. No wheezing. No nocturnal symptoms. No exacerbations. Use Symbicort BID and rarely uses rescue inhaler. Was intubated with pneumonia and CHF about a year ago. Issues of weakness since ICU stay and still recovering. No more hospitalizations for respiratory issues since that time. Quit smoking 27 years ago. Has 20 pack year history. MCMILLAN with shopping or walking > 100 yards. No SOb at rest. Distance limited by deconditioing as well as breathing.     Current Medications:    Current Outpatient Prescriptions:     vitamin B-1 (THIAMINE) 100 MG tablet, TAKE 1 TABLET EVERY DAY, Disp: 90 tablet, Rfl: 3    metoprolol (LOPRESSOR) 100 MG tablet, TAKE 1 TABLET EVERY MORNING  AND TAKE 2 TABLETS EVERY DAY AT BEDTIME, Disp: 270 tablet, Rfl: 1    montelukast (SINGULAIR) 10 MG tablet, TAKE 1 TABLET NIGHTLY, Disp: 90 tablet, Rfl: 1    spironolactone (ALDACTONE) 25 MG tablet, TAKE 1 TABLET EVERY DAY, Disp: 90 tablet, Rfl: 1    oxybutynin (DITROPAN) 5 MG tablet, TAKE 1 TABLET TWICE DAILY, Disp: 180 tablet, Rfl: 1    meclizine (ANTIVERT) 12.5 MG tablet, TAKE ONE TO TWO TABLETS BY MOUTH THREE TIMES A DAY AS NEEDED DIZZINESS AND NAUSEA, Disp: 60 tablet, Rfl: 2    warfarin (COUMADIN) 3 MG tablet, TAKE 3 TABLETS EVERY DAY, Disp: 270 tablet, Rfl: 1    folic acid (FOLVITE) 1 MG tablet, TAKE 1 TABLET EVERY DAY, Disp: 90 tablet, Rfl: 3    furosemide (LASIX) 40 MG tablet, Take 1 tablet by mouth 2 times daily TAKE 1 TABLET BID, Disp: 180 tablet, Rfl: 3    venlafaxine (EFFEXOR XR) 150 MG extended release capsule, TAKE 1 CAPSULE EVERY DAY, Disp: 90 capsule, Rfl: 1    levothyroxine (SYNTHROID) 100 MCG tablet, TAKE 1 TABLET EVERY DAY, Disp: 90 tablet, Rfl: 1    atorvastatin (LIPITOR) 40 MG tablet, TAKE 1 TABLET EVERY DAY, Disp: 90 tablet, Rfl: 1    pantoprazole (PROTONIX) 40 MG tablet, TAKE 1 TABLET TWICE DAILY, Disp: 180 tablet, Rfl: 1    polyethylene glycol (GLYCOLAX) powder, MIX 1 CAPFUL (17GMS) IN LIQUID AND DRINK EVERY DAY, Disp: 1581 g, Rfl: 5    Umeclidinium Bromide 62.5 MCG/INH AEPB, 1 inhalation daily, Disp: 1 each, Rfl: 5    Biotin 5000 MCG CAPS, Take 5,000 mcg by mouth daily, Disp: 1 capsule, Rfl: 0    Coenzyme Q10 (COQ-10) 200 MG CAPS, Take 200 mg by mouth daily, Disp: 1 capsule, Rfl: 0    Respiratory Therapy Supplies (NEBULIZER/TUBING/MOUTHPIECE) KIT, 1 kit by Does not apply route daily as needed (SOB) Dx J44.9 COPD, Disp: 1 kit, Rfl: 11    albuterol (PROVENTIL) (2.5 MG/3ML) 0.083% nebulizer solution, Take 3 mLs by nebulization every 6 hours as needed for Wheezing Dx COPD J44.9, Disp: 120 each, Rfl: 5    Omega-3 Fatty Acids (FISH OIL) 1200 MG CAPS, Take 1 capsule by mouth 2 times daily, Disp: 1 capsule, Rfl: 0    melatonin 5 MG TABS tablet, Take 7 mg by mouth daily , Disp: 1 tablet, Rfl: 0    Probiotic Product (PROBIOTIC ACIDOPHILUS) CAPS, Take 1 capsule by mouth daily, Disp: 1 capsule, Rfl: 0    traMADol (ULTRAM) 50 MG tablet, TAKE 1 TABLET FOUR TIMES DAILY AS NEEDED FOR PAIN, Disp: 360 tablet, Rfl: 1    budesonide-formoterol (SYMBICORT) 80-4.5 MCG/ACT AERO, INHALE 2 PUFFS INTO THE LUNGS TWICE DAILY, Disp: 3 Inhaler, Rfl: 5    albuterol sulfate HFA (PROVENTIL HFA) 108 (90 BASE) MCG/ACT inhaler, Inhale 2 puffs into the lungs every 6 hours as needed for Wheezing, Disp: 3 Inhaler, Rfl: 5    Calcium Carb-Cholecalciferol (CALCIUM + D3 PO), Take 1,200 mg by mouth daily, Disp: , Rfl:     docusate sodium (COLACE) 100 MG capsule, Take 100 mg by mouth daily , Disp: , Rfl:     acetaminophen (TYLENOL 8 HOUR) 650 MG CR tablet, Take 650 mg by mouth every 6 hours as needed. , Disp: , Rfl: Objective:   PHYSICAL EXAM:    VITALS:    /74 (Site: Left Arm, Position: Sitting, Cuff Size: Medium Adult)   Pulse 70   Temp 97.6 °F (36.4 °C) (Oral)   Resp 22   Ht 5' 5\" (1.651 m)   Wt 204 lb (92.5 kg)   SpO2 97% Comment: on room air  BMI 33.95 kg/m²   Constitutional: In no acute distress. Appears stated age. Well developed and nourished  Eyes: PERRL. No sclera icterus. No conjunctival injection. ENT: Oropharynx with small white spots. Neck: Trachea midline. No thyroid tenderness. Lymph: No cervical LAD. No supraclavicular LAD. Resp: No accessory muscle use. No crackles. No wheezes. no rhonchi. CV: Regular rate. Regular rhythm. No murmur or rub. trace lower extremity edema. Musc: No clubbing. No cyanosis. Canary Malady Psych: Oriented x 3. Mood and affect normal. Intact judgment and insight. LABS:  Reviewed any pertinent new labs that are available.   Bronchoscopy 8/5/16 AFB cx + MAC    PFTs  9/5/14 FVC  (%) FEV1 1.28L (61%) FEV1/FVC ratio is < 70%   TLC  (%)  RV  (%)   DLCO (137%) Bronchodilator response: 6MWT: 800ft, 96%  5/10/16 FVC  (74%) FEV1 1.28L (62%) FEV1/FVC ratio is 63%   TLC  (97%)  RV  (%) DLCO (56%) Bronchodilator response: no   12/12/16 FVC  (73%) FEV1 1.30L (65%) FEV1/FVC ratio is 66%   TLC  (91%)  RV  (%) DLCO (98%) Bronchodilator response: no     IMAGING:  I personally reviewed and interpreted the following today in the office:   8/4/16 Chest CT:   Small left pleural effusion.  Patchy bilateral airspace disease is seen,   compatible with given history of pneumonia.  Enlarged subcarinal lymph node   is likely reactive.  Follow-up to resolution recommended.  3 mm noncalcified   left lower lobe pulmonary nodule can also be reassessed at that time.       Small pericardial effusion.       Fluid is seen within the esophagus, which can relate to gastroesophageal   reflux.       Heterogeneous attenuation is seen of the sternum and thoracic spine, for   which some considerations include metabolic bone disease, osteopenia, or   there is predisposing clinical history, metastatic disease. 12/12/16 Chest CT:   The pleural effusions have resolved.  Previously visualized   prominent ground-glass attenuation noted throughout the lungs has resolved. Unchanged scarring with focal bronchiectasis within the upper lobes. Multiple nodules within the lung bases which measure up to 5 mm (series 3,   image 84).  These were not clearly visualized on previous exam likely related   to respiratory motion and pleural effusions. 6/12/17  No substantial change in pulmonary nodules which measure up to 4-5 mm.       Patchy opacity within the lung apices with associated mild bronchiectasis. Findings likely are post inflammatory may represent scarring.       Mild atelectasis versus scarring in the lung bases, similar to slightly more   prominent.       No substantial change in mildly enlarged subcarinal lymph node.       Slight decrease in small pericardial effusion. ASSESSMENT:   · Moderate COPD with asthma overlap  · Pulmonary nodules  · Pulmonary MAC infection: there is not a signifcant amount of lung disease on chest CT that could be attributed to this. The pt does not have night sweats or fevers. She does not have weight loss. · Abnormal Chest CT has been followed for stability  · Seasonal allergies  · Obesity  · Chronic anticoagulation with coumadin  · Not addressed: Chronic grade II Diastolic HF, PAF   · H/o Hodgkins lymphoma and around 1971 had radiation to mediastinum  · h/o pneumonia with resp failure requiring MV   · h/o chronic etoh abuse -resolved 2013   · Mild CANDICE - does not tolerate CPAP     PLAN:   · Symbicort 80/4.5 2 puffs BID, continue PRN albuterol   · Incruse   · Continue Singulair  · pulm rehab completed  · On coumadin  · The Pneumonia Vaccine is up to date. The Flu Vaccine is UTD.    · F/u in 6 months

## 2017-12-15 ENCOUNTER — ANTI-COAG VISIT (OUTPATIENT)
Dept: PHARMACY | Facility: CLINIC | Age: 77
End: 2017-12-15

## 2017-12-15 LAB — INR BLD: 2.8

## 2017-12-28 ENCOUNTER — OFFICE VISIT (OUTPATIENT)
Dept: FAMILY MEDICINE CLINIC | Age: 77
End: 2017-12-28

## 2017-12-28 VITALS
SYSTOLIC BLOOD PRESSURE: 128 MMHG | HEART RATE: 84 BPM | WEIGHT: 201 LBS | DIASTOLIC BLOOD PRESSURE: 60 MMHG | BODY MASS INDEX: 33.49 KG/M2 | HEIGHT: 65 IN

## 2017-12-28 DIAGNOSIS — E53.8 B12 DEFICIENCY: Primary | ICD-10-CM

## 2017-12-28 DIAGNOSIS — J40 BRONCHITIS: ICD-10-CM

## 2017-12-28 DIAGNOSIS — R73.01 IFG (IMPAIRED FASTING GLUCOSE): ICD-10-CM

## 2017-12-28 LAB — VITAMIN B-12: >2000 PG/ML (ref 211–911)

## 2017-12-28 PROCEDURE — G8399 PT W/DXA RESULTS DOCUMENT: HCPCS | Performed by: FAMILY MEDICINE

## 2017-12-28 PROCEDURE — G8598 ASA/ANTIPLAT THER USED: HCPCS | Performed by: FAMILY MEDICINE

## 2017-12-28 PROCEDURE — G8427 DOCREV CUR MEDS BY ELIG CLIN: HCPCS | Performed by: FAMILY MEDICINE

## 2017-12-28 PROCEDURE — 99213 OFFICE O/P EST LOW 20 MIN: CPT | Performed by: FAMILY MEDICINE

## 2017-12-28 PROCEDURE — 1123F ACP DISCUSS/DSCN MKR DOCD: CPT | Performed by: FAMILY MEDICINE

## 2017-12-28 PROCEDURE — 1036F TOBACCO NON-USER: CPT | Performed by: FAMILY MEDICINE

## 2017-12-28 PROCEDURE — 1090F PRES/ABSN URINE INCON ASSESS: CPT | Performed by: FAMILY MEDICINE

## 2017-12-28 PROCEDURE — G8484 FLU IMMUNIZE NO ADMIN: HCPCS | Performed by: FAMILY MEDICINE

## 2017-12-28 PROCEDURE — 36415 COLL VENOUS BLD VENIPUNCTURE: CPT | Performed by: FAMILY MEDICINE

## 2017-12-28 PROCEDURE — G8417 CALC BMI ABV UP PARAM F/U: HCPCS | Performed by: FAMILY MEDICINE

## 2017-12-28 PROCEDURE — 4040F PNEUMOC VAC/ADMIN/RCVD: CPT | Performed by: FAMILY MEDICINE

## 2017-12-28 RX ORDER — AZITHROMYCIN 250 MG/1
TABLET, FILM COATED ORAL
Qty: 1 PACKET | Refills: 0 | Status: SHIPPED | OUTPATIENT
Start: 2017-12-28 | End: 2018-01-07

## 2017-12-28 ASSESSMENT — PATIENT HEALTH QUESTIONNAIRE - PHQ9
SUM OF ALL RESPONSES TO PHQ QUESTIONS 1-9: 0
2. FEELING DOWN, DEPRESSED OR HOPELESS: 0
SUM OF ALL RESPONSES TO PHQ9 QUESTIONS 1 & 2: 0
1. LITTLE INTEREST OR PLEASURE IN DOING THINGS: 0

## 2017-12-28 NOTE — PROGRESS NOTES
Subjective:      Patient ID: Karla Souza is a 76 y.o. female. HPI  Chief Complaint   Patient presents with    3 Month Follow-Up     no refills needed per pt   1 wk ago developed prod cough and wheeze. Nebulized albuteral helping. Has been on po B12 x 3 mo. Pt is due for radha of B12. No sob, cp. Feels stable. Has sold prior home, is pleased. Review of Systems   Constitutional: Negative for appetite change, fever and unexpected weight change. Objective:   Physical Exam  /60   Pulse 84   Ht 5' 5\" (1.651 m)   Wt 201 lb (91.2 kg)   BMI 33.45 kg/m²   HEENT nl, Neck supple, no lad or tmg, no bruit  CV RRR, Lungs CTA  Abd soft, NT, no masses or hsm, BS nl  Ext with strong pedal pulses, no edema  Nl mood and thought process  Assessment:            Plan:      Bryant Casas was seen today for 3 month follow-up. Diagnoses and all orders for this visit:    B12 deficiency  -     VITAMIN B12, resume injections if po not helpful. Bronchitis  -     azithromycin (ZITHROMAX) 250 MG tablet; Take 2 tabs (500 mg) on Day 1, and take 1 tab (250 mg) on days 2 through 5, to fill in 48 hr if not improving furhter. IFG (impaired fasting glucose)  -     Hemoglobin A1C    Plan for pneu 23 next visit.

## 2017-12-29 LAB
ESTIMATED AVERAGE GLUCOSE: 131.2 MG/DL
HBA1C MFR BLD: 6.2 %

## 2018-01-01 ENCOUNTER — HOSPITAL ENCOUNTER (OUTPATIENT)
Dept: OTHER | Age: 78
Discharge: HOME | End: 2018-01-31
Attending: INTERNAL MEDICINE | Admitting: INTERNAL MEDICINE

## 2018-01-12 ENCOUNTER — CARE COORDINATION (OUTPATIENT)
Dept: CARE COORDINATION | Age: 78
End: 2018-01-12

## 2018-01-12 ENCOUNTER — ANTI-COAG VISIT (OUTPATIENT)
Dept: PHARMACY | Facility: CLINIC | Age: 78
End: 2018-01-12

## 2018-01-12 LAB — INR BLD: 2.4

## 2018-01-15 ENCOUNTER — CARE COORDINATION (OUTPATIENT)
Dept: CARE COORDINATION | Age: 78
End: 2018-01-15

## 2018-01-15 RX ORDER — ATORVASTATIN CALCIUM 40 MG/1
TABLET, FILM COATED ORAL
Qty: 90 TABLET | Refills: 1 | Status: SHIPPED | OUTPATIENT
Start: 2018-01-15

## 2018-01-15 RX ORDER — PANTOPRAZOLE SODIUM 40 MG/1
TABLET, DELAYED RELEASE ORAL
Qty: 180 TABLET | Refills: 1 | Status: SHIPPED | OUTPATIENT
Start: 2018-01-15

## 2018-01-15 RX ORDER — VENLAFAXINE HYDROCHLORIDE 150 MG/1
CAPSULE, EXTENDED RELEASE ORAL
Qty: 90 CAPSULE | Refills: 1 | Status: SHIPPED | OUTPATIENT
Start: 2018-01-15

## 2018-01-16 ENCOUNTER — CARE COORDINATOR VISIT (OUTPATIENT)
Dept: CARE COORDINATION | Age: 78
End: 2018-01-16

## 2018-01-16 NOTE — CARE COORDINATION
Callie Pitts  1/16/2018                Registered Dietitian Progress Note for Care Coordination    Assessment: Mary Ugarte is a 76 y.o. female. OV today to provide f/u nutrition education & counseling r/t obesity, pre-DM; check status of set goals:    NUTRITION MONITORING AND EVALUATION  Indicator/Goal Criteria Progress   #1  Usual \"sweet snack\"  #1 Switch daily [place] bar snack (240 kcals) to snack size [place] bar (120 kcals) or a small piece of chocolate (40 kcals) #1 Not achieved; pt states \"my cardiologist says I can have it\". #2  Level of PA #2 Increase via exercise classes at the local [facility] or [facility] #2 Not achieved; pt still transitioning to new residence, new routine. Still intends to check out classes at [facility]. #3  -- #3 -- #3  --       Barriers to meeting goals: fear of failure, lack of support and stress    Action:  - Discussed pt level of satisfaction with nutritional health. Pt reports she is disappointed in current wt but feels she is eating a healthful & balanced intake. Not wishing to make adjustments to usual nutrition patterns at this time. Has been counting which pt reports \"has made me lose all enjoyment of food\". - Examined feelings of dissatisfaction with wt which reveals pt is struggling with issues/emotions related the aging process, I.e., is having increased urinary leakage, increased loss of balance, decreased energy, struggling with relying on family members for transportation. Plan of Care:  - Will route note to [address] to inform [facility] of various issues pt shared today to determine best course of action.  - Pt to set intentions to engage in daily activities that are enjoyable and provide meaning: prepping more meals at home, adjusting kitchen at new residence to suit needs, making time to paint, relinquish kcal counting, check out senior classes at [facility]. Follow Up:    - None; informed pt of RD/CDE departure from organization.   Pt will continue to be followed by [address], MS, RD, LD, CDE  Care Coordination Team Dietitian  [phone]

## 2018-01-18 RX ORDER — ALBUTEROL SULFATE 90 UG/1
2 AEROSOL, METERED RESPIRATORY (INHALATION) EVERY 6 HOURS PRN
Qty: 3 INHALER | Refills: 1 | Status: SHIPPED | OUTPATIENT
Start: 2018-01-18

## 2018-01-18 RX ORDER — BUDESONIDE AND FORMOTEROL FUMARATE DIHYDRATE 80; 4.5 UG/1; UG/1
AEROSOL RESPIRATORY (INHALATION)
Qty: 3 INHALER | Refills: 1 | Status: SHIPPED | OUTPATIENT
Start: 2018-01-18

## 2018-01-24 ENCOUNTER — OFFICE VISIT (OUTPATIENT)
Dept: CARDIOLOGY CLINIC | Age: 78
End: 2018-01-24

## 2018-01-24 ENCOUNTER — HOSPITAL ENCOUNTER (OUTPATIENT)
Dept: GENERAL RADIOLOGY | Age: 78
Discharge: HOME | End: 2018-01-24
Attending: NURSE PRACTITIONER | Admitting: NURSE PRACTITIONER

## 2018-01-24 ENCOUNTER — CARE COORDINATION (OUTPATIENT)
Dept: CARE COORDINATION | Age: 78
End: 2018-01-24

## 2018-01-24 ENCOUNTER — HOSPITAL ENCOUNTER (OUTPATIENT)
Dept: OTHER | Age: 78
Discharge: HOME | End: 2018-01-31
Attending: NURSE PRACTITIONER | Admitting: NURSE PRACTITIONER

## 2018-01-24 VITALS
WEIGHT: 204 LBS | HEART RATE: 81 BPM | DIASTOLIC BLOOD PRESSURE: 52 MMHG | BODY MASS INDEX: 33.99 KG/M2 | SYSTOLIC BLOOD PRESSURE: 132 MMHG | OXYGEN SATURATION: 97 % | HEIGHT: 65 IN

## 2018-01-24 DIAGNOSIS — I50.32 CHRONIC DIASTOLIC CHF (CONGESTIVE HEART FAILURE) (HCC): ICD-10-CM

## 2018-01-24 DIAGNOSIS — R15.1 FECAL SMEARING: ICD-10-CM

## 2018-01-24 DIAGNOSIS — N39.45 CONTINUOUS LEAKAGE OF URINE: ICD-10-CM

## 2018-01-24 DIAGNOSIS — R06.02 SHORTNESS OF BREATH: ICD-10-CM

## 2018-01-24 DIAGNOSIS — I50.32 CHRONIC DIASTOLIC CHF (CONGESTIVE HEART FAILURE) (HCC): Primary | ICD-10-CM

## 2018-01-24 DIAGNOSIS — I35.0 NONRHEUMATIC AORTIC VALVE STENOSIS: ICD-10-CM

## 2018-01-24 LAB
ANION GAP SERPL CALCULATED.3IONS-SCNC: 11 MMOL/L (ref 3–16)
BUN BLDV-MCNC: 24 MG/DL (ref 7–20)
CALCIUM SERPL-MCNC: 9 MG/DL (ref 8.3–10.6)
CHLORIDE BLD-SCNC: 94 MMOL/L (ref 99–110)
CO2: 32 MMOL/L (ref 21–32)
CREAT SERPL-MCNC: 1.1 MG/DL (ref 0.6–1.2)
GFR AFRICAN AMERICAN: 58
GFR NON-AFRICAN AMERICAN: 48
GLUCOSE BLD-MCNC: 87 MG/DL (ref 70–99)
POTASSIUM SERPL-SCNC: 4.4 MMOL/L (ref 3.5–5.1)
PRO-BNP: 805 PG/ML (ref 0–449)
SODIUM BLD-SCNC: 137 MMOL/L (ref 136–145)

## 2018-01-24 PROCEDURE — 99214 OFFICE O/P EST MOD 30 MIN: CPT | Performed by: NURSE PRACTITIONER

## 2018-01-24 PROCEDURE — G8427 DOCREV CUR MEDS BY ELIG CLIN: HCPCS | Performed by: NURSE PRACTITIONER

## 2018-01-24 PROCEDURE — G8598 ASA/ANTIPLAT THER USED: HCPCS | Performed by: NURSE PRACTITIONER

## 2018-01-24 PROCEDURE — 4040F PNEUMOC VAC/ADMIN/RCVD: CPT | Performed by: NURSE PRACTITIONER

## 2018-01-24 PROCEDURE — G8484 FLU IMMUNIZE NO ADMIN: HCPCS | Performed by: NURSE PRACTITIONER

## 2018-01-24 PROCEDURE — 0509F URINE INCON PLAN DOCD: CPT | Performed by: NURSE PRACTITIONER

## 2018-01-24 PROCEDURE — 93000 ELECTROCARDIOGRAM COMPLETE: CPT | Performed by: NURSE PRACTITIONER

## 2018-01-24 PROCEDURE — 1123F ACP DISCUSS/DSCN MKR DOCD: CPT | Performed by: NURSE PRACTITIONER

## 2018-01-24 PROCEDURE — G8417 CALC BMI ABV UP PARAM F/U: HCPCS | Performed by: NURSE PRACTITIONER

## 2018-01-24 PROCEDURE — G8399 PT W/DXA RESULTS DOCUMENT: HCPCS | Performed by: NURSE PRACTITIONER

## 2018-01-24 PROCEDURE — 1090F PRES/ABSN URINE INCON ASSESS: CPT | Performed by: NURSE PRACTITIONER

## 2018-01-24 PROCEDURE — 1036F TOBACCO NON-USER: CPT | Performed by: NURSE PRACTITIONER

## 2018-01-24 RX ORDER — VALSARTAN 40 MG/1
20 TABLET ORAL DAILY
Qty: 30 TABLET | Refills: 3 | Status: SHIPPED | OUTPATIENT
Start: 2018-01-24 | End: 2018-03-22 | Stop reason: SDUPTHER

## 2018-01-24 NOTE — LETTER
Constitutional and General Appearance: no apparent distress, appears tired  HEENT: non-icteric sclera, oropharynx without exudate, oral mucosa moist  Neck: JVP less than 8cm H20, kyphosis of neck  Respiratory:  · No use of accessory muscles  · Clear breath sounds throughout, no wheezing, no crackles, no rhonchi  Cardiovascular:  · The apical impulses not displaced  · +  Murmur, no rub/gallop  · Regular rate and irregular rhythm, S1,S2 normal  · Radial pulses 2+ and equal bilaterally  · Trace edema  · Pedal Pulses: 2+ and equal   Abdomen:  · No masses or tenderness  · Liver: No Abnormalities Noted  Musculoskeletal/Skin:  · Exhibits normal gait balance and coordination  · There is no clubbing, cyanosis of the extremities  · Skin is warm and dry  · Moves all extremities well  Neurological/Psychiatric:  · Alert and oriented in all spheres  · No abnormalities of mood, affect, memory, mentation, or behavior are noted    Lab Data:  CBC:   Lab Results   Component Value Date    WBC 6.2 09/19/2017    WBC 8.8 03/05/2017    WBC 8.3 03/03/2017    RBC 3.64 09/19/2017    RBC 3.47 03/05/2017    RBC 3.75 03/03/2017    HGB 11.2 09/19/2017    HGB 11.0 03/05/2017    HGB 11.9 03/03/2017    HCT 33.8 09/19/2017    HCT 32.8 03/05/2017    HCT 35.5 03/03/2017    MCV 93.0 09/19/2017    MCV 94.3 03/05/2017    MCV 94.6 03/03/2017    RDW 15.3 09/19/2017    RDW 14.6 03/05/2017    RDW 14.7 03/03/2017     09/19/2017     03/05/2017     03/03/2017     BMP:   Lab Results   Component Value Date     10/24/2017     09/19/2017     05/25/2017    K 4.6 10/24/2017    K 4.8 09/19/2017    K 4.4 05/25/2017    CL 94 10/24/2017    CL 91 09/19/2017    CL 89 05/25/2017    CO2 33 10/24/2017    CO2 27 09/19/2017    CO2 30 05/25/2017    PHOS 4.1 03/06/2017    PHOS 4.0 03/05/2017    PHOS 4.0 03/04/2017    BUN 26 10/24/2017    BUN 29 09/19/2017    BUN 29 05/25/2017    CREATININE 1.0 10/24/2017    CREATININE 1.1 09/19/2017

## 2018-01-24 NOTE — PROGRESS NOTES
capsule Take 100 mg by mouth daily    Yes Historical Provider, MD   acetaminophen (TYLENOL 8 HOUR) 650 MG CR tablet Take 650 mg by mouth every 6 hours as needed. Yes Historical Provider, MD        Allergies:  Codeine; Lisinopril; Unasyn [ampicillin-sulbactam sodium]; and Vancomycin     Review of Systems:   · Constitutional: there has been no unanticipated weight loss. · Eyes: No vision changes  · ENT: No Headaches, no nasal congestion. No mouth sores or sore throat. · Cardiovascular: Reviewed in HPI  · Respiratory: No cough or wheezing, no sputum production. · Gastrointestinal: No abdominal pain, no constipation or diarrhea, + fecal incontinence  · Genitourinary: No dysuria, trouble voiding, or hematuria, + incontinence  · Musculoskeletal:  No weakness or joint complaints. · Integumentary: No rash or pruritis. · Neurological: No numbness or tingling. No weakness. No tremor. · Psychiatric: No anxiety, no depression. · Endocrine:  No excessive thirst or urination. · Hematologic/Lymphatic: No abnormal bruising or bleeding, blood clots or swollen lymph nodes.     Physical Examination:    Vitals:    01/24/18 0910   BP: (!) 132/52   Pulse: 81   SpO2: 97%   Weight: 204 lb (92.5 kg)   Height: 5' 5\" (1.651 m)        Constitutional and General Appearance: no apparent distress, appears tired  HEENT: non-icteric sclera, oropharynx without exudate, oral mucosa moist  Neck: JVP less than 8cm H20, kyphosis of neck  Respiratory:  · No use of accessory muscles  · Clear breath sounds throughout, no wheezing, no crackles, no rhonchi  Cardiovascular:  · The apical impulses not displaced  · +  Murmur, no rub/gallop  · Regular rate and irregular rhythm, S1,S2 normal  · Radial pulses 2+ and equal bilaterally  · Trace edema  · Pedal Pulses: 2+ and equal   Abdomen:  · No masses or tenderness  · Liver: No Abnormalities Noted  Musculoskeletal/Skin:  · Exhibits normal gait balance and coordination  · There is no clubbing, cyanosis of the extremities  · Skin is warm and dry  · Moves all extremities well  Neurological/Psychiatric:  · Alert and oriented in all spheres  · No abnormalities of mood, affect, memory, mentation, or behavior are noted    Lab Data:  CBC:   Lab Results   Component Value Date    WBC 6.2 09/19/2017    WBC 8.8 03/05/2017    WBC 8.3 03/03/2017    RBC 3.64 09/19/2017    RBC 3.47 03/05/2017    RBC 3.75 03/03/2017    HGB 11.2 09/19/2017    HGB 11.0 03/05/2017    HGB 11.9 03/03/2017    HCT 33.8 09/19/2017    HCT 32.8 03/05/2017    HCT 35.5 03/03/2017    MCV 93.0 09/19/2017    MCV 94.3 03/05/2017    MCV 94.6 03/03/2017    RDW 15.3 09/19/2017    RDW 14.6 03/05/2017    RDW 14.7 03/03/2017     09/19/2017     03/05/2017     03/03/2017     BMP:   Lab Results   Component Value Date     10/24/2017     09/19/2017     05/25/2017    K 4.6 10/24/2017    K 4.8 09/19/2017    K 4.4 05/25/2017    CL 94 10/24/2017    CL 91 09/19/2017    CL 89 05/25/2017    CO2 33 10/24/2017    CO2 27 09/19/2017    CO2 30 05/25/2017    PHOS 4.1 03/06/2017    PHOS 4.0 03/05/2017    PHOS 4.0 03/04/2017    BUN 26 10/24/2017    BUN 29 09/19/2017    BUN 29 05/25/2017    CREATININE 1.0 10/24/2017    CREATININE 1.1 09/19/2017    CREATININE 1.2 05/25/2017     BNP:   Lab Results   Component Value Date    PROBNP 824 10/24/2017    PROBNP 749 09/19/2017    PROBNP 825 05/25/2017       Recent Testing:  Echo 3/2017  Summary   -- Normal left ventricle size. There is mild concentric left ventricular   hypertrophy. There is hyperdynamic LV systolic function with an estimated ejection fraction of > 65%.   No regional wall motion abnormalities are seen. Elevated left ventricular diastolic filling pressure.   -- There is a late peaking gradient recorded across the LV outflow tract consistent with dynamic obstruction with valsalva a peak gradient of 13  mmHg.   -- Mild mitral regurgitation. Moderate mitral stenosis (mean gradient   6mmHg).    -- about 2 weeks depending on medication starting  3. Will consider starting Valsartan 20 mg daily for fluid balance and to help with reducing amount of lasix, side effects discussed  4. No changes to medication until labs reviewed  5. Monitor blood pressures at home  6. Referral to GYN for incontinence  7. Follow 8 weeks      QUALITY MEASURES  1. Tobacco Cessation Counseling: NA  2. Retake of BP if >140/90:   NA  3. Documentation to PCP/referring for new patient:  Sent to PCP at close of office visit  4. CAD patient on anti-platelet: NA  5. CAD patient on STATIN therapy:  NA  6. Patient with CHF and aFib on anticoagulation:  Yes     I appreciate the opportunity for caring for this patient.      Joaquín Guillaume CNP, 1/24/2018, 9:16 AM

## 2018-01-25 ENCOUNTER — TELEPHONE (OUTPATIENT)
Dept: CARDIOLOGY CLINIC | Age: 78
End: 2018-01-25

## 2018-01-25 NOTE — TELEPHONE ENCOUNTER
----- Message from Kiley Lopez CNP sent at 1/24/2018 10:38 PM EST -----  Please have patient start Valsartan 20 mg daily for fluid balance and to help with reducing amount of lasix. Currently keep lasix the same (40mg BID) and continue daily weights, will have her hold off on taking extra lasix for now with adding the valsartan.   Repeat BMP in 2 weeks

## 2018-02-01 ENCOUNTER — HOSPITAL ENCOUNTER (OUTPATIENT)
Dept: OTHER | Age: 78
Discharge: HOME | End: 2018-02-28
Attending: INTERNAL MEDICINE | Admitting: INTERNAL MEDICINE

## 2018-02-01 ENCOUNTER — HOSPITAL ENCOUNTER (OUTPATIENT)
Dept: OTHER | Age: 78
Discharge: HOME | End: 2018-02-28
Attending: NURSE PRACTITIONER | Admitting: NURSE PRACTITIONER

## 2018-02-09 ENCOUNTER — TELEPHONE (OUTPATIENT)
Dept: FAMILY MEDICINE CLINIC | Age: 78
End: 2018-02-09

## 2018-02-09 ENCOUNTER — ANTI-COAG VISIT (OUTPATIENT)
Dept: PHARMACY | Facility: CLINIC | Age: 78
End: 2018-02-09

## 2018-02-09 ENCOUNTER — HOSPITAL ENCOUNTER (OUTPATIENT)
Dept: GENERAL RADIOLOGY | Age: 78
Discharge: HOME | End: 2018-02-09
Attending: NURSE PRACTITIONER | Admitting: NURSE PRACTITIONER

## 2018-02-09 LAB
ANION GAP SERPL CALCULATED.3IONS-SCNC: 13 MMOL/L (ref 3–16)
BUN BLDV-MCNC: 27 MG/DL (ref 7–20)
CALCIUM SERPL-MCNC: 9.1 MG/DL (ref 8.3–10.6)
CHLORIDE BLD-SCNC: 91 MMOL/L (ref 99–110)
CO2: 31 MMOL/L (ref 21–32)
CREAT SERPL-MCNC: 1.2 MG/DL (ref 0.6–1.2)
GFR AFRICAN AMERICAN: 52
GFR NON-AFRICAN AMERICAN: 43
GLUCOSE BLD-MCNC: 100 MG/DL (ref 70–99)
INR BLD: 2.8
POTASSIUM SERPL-SCNC: 4.5 MMOL/L (ref 3.5–5.1)
PRO-BNP: 809 PG/ML (ref 0–449)
SODIUM BLD-SCNC: 135 MMOL/L (ref 136–145)

## 2018-02-09 NOTE — TELEPHONE ENCOUNTER
Sara with [facility] called and states that the patient is there and that the patient had said that Dr. Zainab Carrero had wanted her to have her vitamin B12 level check. Sara states that there is not an order in [address] [place] [address] Rd. Please advise.

## 2018-02-09 NOTE — TELEPHONE ENCOUNTER
Sara with [facility] called back to check on her message regarding an order for B12 level. Sara stated that she orion an extra tube just in case Dr. Suma Narayanan wanted to add it on. Please call Sara.

## 2018-02-12 ENCOUNTER — TELEPHONE (OUTPATIENT)
Dept: CARDIOLOGY CLINIC | Age: 78
End: 2018-02-12

## 2018-02-13 ENCOUNTER — HOSPITAL ENCOUNTER (OUTPATIENT)
Dept: SURGERY | Age: 78
Discharge: HOME | End: 2018-02-13
Attending: OPHTHALMOLOGY | Admitting: OPHTHALMOLOGY

## 2018-02-13 VITALS — DIASTOLIC BLOOD PRESSURE: 42 MMHG | HEART RATE: 79 BPM | SYSTOLIC BLOOD PRESSURE: 147 MMHG

## 2018-02-13 DIAGNOSIS — H26.492 OTHER SECONDARY CATARACT, LEFT EYE: ICD-10-CM

## 2018-02-13 RX ORDER — TROPICAMIDE 10 MG/ML
1 SOLUTION/ DROPS OPHTHALMIC
Status: COMPLETED | OUTPATIENT
Start: 2018-02-13 | End: 2018-02-13

## 2018-02-13 RX ORDER — PROPARACAINE HYDROCHLORIDE 5 MG/ML
1 SOLUTION/ DROPS OPHTHALMIC
Status: ACTIVE | OUTPATIENT
Start: 2018-02-13 | End: 2018-02-13

## 2018-02-13 RX ADMIN — TROPICAMIDE 1 DROP: 10 SOLUTION/ DROPS OPHTHALMIC at 07:00

## 2018-02-13 NOTE — COMMUNICATION BODY
[facility]   Cardiac Follow-up    Primary Care Doctor:  Azeb Chavez MD    Chief Complaint   Patient presents with    Follow-up    Congestive Heart Failure    Shortness of Breath        History of Present Illness:   I had the pleasure of seeing Lisset Olivas in follow up for  dCHF, MS, AS, PAF on coumadin. Since last visit, she had a fever on Sunday, no chest congestion or GI symptoms, only muscle aches and resolved within 24 hours. Some lightheadedness/dizzines on Sunday but not since. Continues with shortness of breath with some activity, at her baseline. To have to eye surgery for cataracts in feb and march. Weights at home running 201-202lbs at home. Taking an extra lasix about 2-3 times a week. She is frustrated with urinary incontinence along with urgency with taking the lasix. She is not as active as she wants to be due to the urinary incontinence. Lisset Olivas describes symptoms including dyspnea, palpitations, fatigue, edema but denies chest pain, orthopnea, exertional chest pressure/discomfort, syncope. NYHA:   III  ACC/ AHA Stage:    C    Past Medical History:   has a past medical history of Asthma; Asthma; Atrial fibrillation ([address] Cancer ([address] Cellulitis; CHF (congestive heart failure) ([address] Chronic kidney disease; Chronic pain; Constipation; COPD (chronic obstructive pulmonary disease) ([address] DDD (degenerative disc disease), lumbar; Falls frequently; Fibromyalgia; Fibromyalgia; GERD (gastroesophageal reflux disease); Gout; Headaches, cluster; Hodgkin's disease ([address] Hyperlipidemia; Hypertension; IFG (impaired fasting glucose); Mycobacterium avium complex ([address] Neck fracture ([address] Neuralgic migraines; CANDICE (obstructive sleep apnea); Pernicious anemia; Pneumonia; Prediabetes; Sleep disorder; Thyroid disease; and Unspecified cerebral artery occlusion with cerebral infarction.   Surgical History:   has a past surgical history that includes Neck surgery (2010); Tubal ligation; Hysterectomy; Splenectomy; tumor removal; other surgical history (2012); Rectal surgery (2002); Neck surgery (1980s); Finger surgery (1/9/13); Diagnostic Cardiac Cath Lab Procedure; and bronchoscopy. Social History:   reports that she quit smoking about 31 years ago. Her smoking use included Cigarettes. She has a 22.50 pack-year smoking history. She has never used smokeless tobacco. She reports that she does not drink alcohol or use drugs. Family History:   Family History   Problem Relation Age of Onset    Diabetes Mother     Heart Disease Father     Heart Disease Sister     Cancer Brother     Diabetes Sister     Cancer Brother     Cancer Brother     Cancer Son      NMSC       Home Medications:  Prior to Admission medications    Medication Sig Start Date End Date Taking?  Authorizing Provider   albuterol sulfate HFA (VENTOLIN HFA) 108 (90 Base) MCG/ACT inhaler Inhale 2 puffs into the lungs every 6 hours as needed for Wheezing Dx J44.9 1/18/18  Yes Reva Lennox, MD   budesonide-formoterol (SYMBICORT) 80-4.5 MCG/ACT AERO INHALE 2 PUFFS INTO THE LUNGS TWICE DAILY 1/18/18  Yes Reva Lennox, MD   pantoprazole (PROTONIX) 40 MG tablet TAKE 1 TABLET TWICE DAILY 1/15/18  Yes Meredith Doherty MD   venlafaxine (EFFEXOR XR) 150 MG extended release capsule TAKE 1 CAPSULE EVERY DAY 1/15/18  Yes Meredith Doherty MD   atorvastatin (LIPITOR) 40 MG tablet TAKE 1 TABLET EVERY DAY 1/15/18  Yes Meredith Doherty MD   levothyroxine (SYNTHROID) 100 MCG tablet TAKE 1 TABLET EVERY DAY 12/18/17  Yes Meredith Doherty MD   vitamin B-1 (THIAMINE) 100 MG tablet TAKE 1 TABLET EVERY DAY 12/11/17  Yes Meredith Doherty MD   metoprolol (LOPRESSOR) 100 MG tablet TAKE 1 TABLET EVERY MORNING  AND TAKE 2 TABLETS EVERY DAY AT BEDTIME 8/30/17  Yes Meredith Doherty MD   montelukast (SINGULAIR) 10 MG tablet TAKE 1 TABLET NIGHTLY 8/30/17  Yes Meredith Doherty MD   spironolactone (ALDACTONE) 25 MG tablet TAKE 1 TABLET EVERY DAY 8/30/17 of the extremities  · Skin is warm and dry  · Moves all extremities well  Neurological/Psychiatric:  · Alert and oriented in all spheres  · No abnormalities of mood, affect, memory, mentation, or behavior are noted    Lab Data:  CBC:   Lab Results   Component Value Date    WBC 6.2 09/19/2017    WBC 8.8 03/05/2017    WBC 8.3 03/03/2017    RBC 3.64 09/19/2017    RBC 3.47 03/05/2017    RBC 3.75 03/03/2017    HGB 11.2 09/19/2017    HGB 11.0 03/05/2017    HGB 11.9 03/03/2017    HCT 33.8 09/19/2017    HCT 32.8 03/05/2017    HCT 35.5 03/03/2017    MCV 93.0 09/19/2017    MCV 94.3 03/05/2017    MCV 94.6 03/03/2017    RDW 15.3 09/19/2017    RDW 14.6 03/05/2017    RDW 14.7 03/03/2017     09/19/2017     03/05/2017     03/03/2017     BMP:   Lab Results   Component Value Date     10/24/2017     09/19/2017     05/25/2017    K 4.6 10/24/2017    K 4.8 09/19/2017    K 4.4 05/25/2017    CL 94 10/24/2017    CL 91 09/19/2017    CL 89 05/25/2017    CO2 33 10/24/2017    CO2 27 09/19/2017    CO2 30 05/25/2017    PHOS 4.1 03/06/2017    PHOS 4.0 03/05/2017    PHOS 4.0 03/04/2017    BUN 26 10/24/2017    BUN 29 09/19/2017    BUN 29 05/25/2017    CREATININE 1.0 10/24/2017    CREATININE 1.1 09/19/2017    CREATININE 1.2 05/25/2017     BNP:   Lab Results   Component Value Date    PROBNP 824 10/24/2017    PROBNP 749 09/19/2017    PROBNP 825 05/25/2017       Recent Testing:  Echo 3/2017  Summary   -- Normal left ventricle size. There is mild concentric left ventricular   hypertrophy. There is hyperdynamic LV systolic function with an estimated ejection fraction of > 65%.   No regional wall motion abnormalities are seen. Elevated left ventricular diastolic filling pressure.   -- There is a late peaking gradient recorded across the LV outflow tract consistent with dynamic obstruction with valsalva a peak gradient of 13  mmHg.   -- Mild mitral regurgitation. Moderate mitral stenosis (mean gradient   6mmHg).    --

## 2018-02-16 ENCOUNTER — CARE COORDINATION (OUTPATIENT)
Dept: CARE COORDINATION | Age: 78
End: 2018-02-16

## 2018-02-16 NOTE — CARE COORDINATION
Ambulatory Care Coordination Note  2/16/2018  CM Risk Score: 7  Garrison Mortality Risk Score: 30.63    ACC: Loretta Stone RN    Summary Note: Spoke with patient regarding follow up from visit with [facility] where patient voiced some concerns-is having increased urinary leakage which she describes as affecting daily routine (i.e., doesn't want to go to grocery store d/t concern she'll leak through pads)  2) describes increased loss of balance.     Spoke with patient about these concerns and she states she is going to make an appt with Dr. Tomer Foss to discuss a pessary or other options besides surgical options. Patient also to make an appt with Dr. Hermelinda Echevarria for the balance issues. However, she is going to hold of on scheduling at this time due to a sister that is ill and dying and she will be traveling to see her soon. Offered support and resources and referral to Spiritual Care, but patient declined at this time and states she has support from her Zoroastrianism. Patient states her weight has been stable and she is still working on controlling her portion sizes to lose some weight.     Care Coordination Interventions    Program Enrollment:  Complex Care  Referral from Primary Care Provider:  Yes  Suggested Interventions and Community Resources  Diabetes Education:  Completed (Comment: Pre-diabetes education provided by [facility] and [facility])  Disease Specific Clinic:  Completed (Comment: Shared Medical Appts for CHF)  Grief Counselor:  Declined (Comment: One sister in hospice, brother in law in nursing home and sister not in good health, also--Referred to [facility]- patient declined 2/16/2018, has support from her Zoroastrianism)  Physical Therapy:  Completed  Registered Dietician:  Completed (Comment: With [facility])  Other Therapy Services:  Completed (Comment: Patient went to [facility] for Balance Therapy 2 x week)  Zone Management Tools:  Completed         Goals Addressed             Most Recent Zina Oppenheim, MD [facility] AMB SURG None   3/22/2018 12:30 PM ELEANOR Ott Fees [facility]   4/25/2018 9:00 AM Soledad Mendoza MD [facility]   6/19/2018 9:30 AM [facility]     Congestive Heart Failure Assessment    Are you currently restricting fluids?:  1800cc  Do you understand a low sodium diet?:  Yes  Do you understand how to read food labels?:  Yes  How many restaurant meals do you eat per week?:  1-2  Do you salt your food before tasting it?:  No     No patient-reported symptoms      Symptoms:            Corby Jc, RN   Care Coordinator  [phone]

## 2018-02-22 DIAGNOSIS — I50.32 CHRONIC DIASTOLIC CONGESTIVE HEART FAILURE (HCC): ICD-10-CM

## 2018-02-23 ENCOUNTER — TELEPHONE (OUTPATIENT)
Dept: CARDIOLOGY CLINIC | Age: 78
End: 2018-02-23

## 2018-02-23 RX ORDER — POLYETHYLENE GLYCOL 3350 17 G/17G
POWDER, FOR SOLUTION ORAL
Qty: 1581 G | Refills: 1 | Status: SHIPPED | OUTPATIENT
Start: 2018-02-23

## 2018-02-23 RX ORDER — WARFARIN SODIUM 3 MG/1
TABLET ORAL
Qty: 270 TABLET | Refills: 1 | Status: SHIPPED | OUTPATIENT
Start: 2018-02-23

## 2018-02-23 RX ORDER — SPIRONOLACTONE 25 MG/1
TABLET ORAL
Qty: 90 TABLET | Refills: 1 | Status: SHIPPED | OUTPATIENT
Start: 2018-02-23

## 2018-02-23 RX ORDER — MONTELUKAST SODIUM 10 MG/1
TABLET ORAL
Qty: 90 TABLET | Refills: 1 | Status: SHIPPED | OUTPATIENT
Start: 2018-02-23

## 2018-02-23 NOTE — TELEPHONE ENCOUNTER
Pt sts she missed her appt with SHANTI due to the passing of her sister. Asked pt if she wanted to reschedule. Pt said she will call back in about a week when everything settles down.

## 2018-03-01 ENCOUNTER — HOSPITAL ENCOUNTER (OUTPATIENT)
Dept: OTHER | Age: 78
Discharge: HOME | End: 2018-03-31
Attending: NURSE PRACTITIONER | Admitting: NURSE PRACTITIONER

## 2018-03-01 ENCOUNTER — HOSPITAL ENCOUNTER (OUTPATIENT)
Dept: OTHER | Age: 78
Discharge: HOME | End: 2018-03-31
Attending: INTERNAL MEDICINE | Admitting: INTERNAL MEDICINE

## 2018-03-09 ENCOUNTER — ANTI-COAG VISIT (OUTPATIENT)
Dept: PHARMACY | Facility: CLINIC | Age: 78
End: 2018-03-09

## 2018-03-09 LAB — INR BLD: 3.9

## 2018-03-10 NOTE — OP NOTE
Charissa Durham    OPERATIVE NOTE    Preoperative Diagnosis: Posterior Capsular Opacification the right eye    Postoperative Diagnosis: Posterior Capsular Opacification right eye    Procedure: YAG posterior capsulotomy the right eye    Surgeon: Tai Perez M.D., Ph.D. Anesthesia: Topical    Complications: none    Specimens: none    Indications for procedure: The patient is a 76y.o. year old who is status post cataract extraction with intraocular lens implantation who complained of increasing glare and blurry vision. Visually significant posterior capsular opacification was noted on examination. Risks, benefits, and alternatives to surgery were discussed with the patient and the patient elected to proceed with YAG laser posterior capsulotomy of the the right eye. Details of the procedure: The patient was brought the laser room. The patient had topical proparacaine drops instilled. The patient was positioned at the YAG laser where 17 shots were administered at 3.1 millijoules for a total power of 50.9 millijoules in a cruciate pattern. The patient tolerated the procedure well. One drop of prednisolone acetate 1% was placed on the eye. The patient will follow up as an outpatient as scheduled.     Tai Perez MD, PhD

## 2018-03-13 ENCOUNTER — HOSPITAL ENCOUNTER (OUTPATIENT)
Dept: SURGERY | Age: 78
Discharge: HOME | End: 2018-03-13
Attending: OPHTHALMOLOGY | Admitting: OPHTHALMOLOGY

## 2018-03-13 VITALS
DIASTOLIC BLOOD PRESSURE: 42 MMHG | SYSTOLIC BLOOD PRESSURE: 124 MMHG | HEART RATE: 81 BPM | OXYGEN SATURATION: 93 % | RESPIRATION RATE: 14 BRPM

## 2018-03-13 DIAGNOSIS — H26.492 OTHER SECONDARY CATARACT, LEFT EYE: ICD-10-CM

## 2018-03-13 RX ORDER — TROPICAMIDE 10 MG/ML
1 SOLUTION/ DROPS OPHTHALMIC
Status: COMPLETED | OUTPATIENT
Start: 2018-03-13 | End: 2018-03-13

## 2018-03-13 RX ORDER — PROPARACAINE HYDROCHLORIDE 5 MG/ML
1 SOLUTION/ DROPS OPHTHALMIC
Status: ACTIVE | OUTPATIENT
Start: 2018-03-13 | End: 2018-03-13

## 2018-03-13 RX ADMIN — TROPICAMIDE 1 DROP: 10 SOLUTION/ DROPS OPHTHALMIC at 06:46

## 2018-03-13 ASSESSMENT — PAIN - FUNCTIONAL ASSESSMENT: PAIN_FUNCTIONAL_ASSESSMENT: 0-10

## 2018-03-22 ENCOUNTER — OFFICE VISIT (OUTPATIENT)
Dept: CARDIOLOGY CLINIC | Age: 78
End: 2018-03-22

## 2018-03-22 VITALS
SYSTOLIC BLOOD PRESSURE: 110 MMHG | BODY MASS INDEX: 33.82 KG/M2 | HEART RATE: 84 BPM | OXYGEN SATURATION: 94 % | HEIGHT: 65 IN | WEIGHT: 203 LBS | DIASTOLIC BLOOD PRESSURE: 42 MMHG

## 2018-03-22 DIAGNOSIS — I05.0 MODERATE MITRAL STENOSIS: ICD-10-CM

## 2018-03-22 DIAGNOSIS — R53.82 CHRONIC FATIGUE: ICD-10-CM

## 2018-03-22 DIAGNOSIS — I50.32 CHRONIC DIASTOLIC CHF (CONGESTIVE HEART FAILURE) (HCC): Primary | ICD-10-CM

## 2018-03-22 DIAGNOSIS — R06.02 SHORTNESS OF BREATH: ICD-10-CM

## 2018-03-22 DIAGNOSIS — I35.1 NONRHEUMATIC AORTIC VALVE INSUFFICIENCY: ICD-10-CM

## 2018-03-22 DIAGNOSIS — I48.0 PAROXYSMAL ATRIAL FIBRILLATION (HCC): ICD-10-CM

## 2018-03-22 PROCEDURE — G8598 ASA/ANTIPLAT THER USED: HCPCS | Performed by: NURSE PRACTITIONER

## 2018-03-22 PROCEDURE — G8417 CALC BMI ABV UP PARAM F/U: HCPCS | Performed by: NURSE PRACTITIONER

## 2018-03-22 PROCEDURE — 1090F PRES/ABSN URINE INCON ASSESS: CPT | Performed by: NURSE PRACTITIONER

## 2018-03-22 PROCEDURE — G8427 DOCREV CUR MEDS BY ELIG CLIN: HCPCS | Performed by: NURSE PRACTITIONER

## 2018-03-22 PROCEDURE — 4040F PNEUMOC VAC/ADMIN/RCVD: CPT | Performed by: NURSE PRACTITIONER

## 2018-03-22 PROCEDURE — 1123F ACP DISCUSS/DSCN MKR DOCD: CPT | Performed by: NURSE PRACTITIONER

## 2018-03-22 PROCEDURE — 1036F TOBACCO NON-USER: CPT | Performed by: NURSE PRACTITIONER

## 2018-03-22 PROCEDURE — 99214 OFFICE O/P EST MOD 30 MIN: CPT | Performed by: NURSE PRACTITIONER

## 2018-03-22 PROCEDURE — G8399 PT W/DXA RESULTS DOCUMENT: HCPCS | Performed by: NURSE PRACTITIONER

## 2018-03-22 PROCEDURE — G8482 FLU IMMUNIZE ORDER/ADMIN: HCPCS | Performed by: NURSE PRACTITIONER

## 2018-03-22 RX ORDER — VALSARTAN 40 MG/1
20 TABLET ORAL 2 TIMES DAILY
Qty: 60 TABLET | Refills: 3
Start: 2018-03-22 | End: 2018-04-05 | Stop reason: SDUPTHER

## 2018-03-22 RX ORDER — METOPROLOL TARTRATE 100 MG/1
TABLET ORAL
Qty: 270 TABLET | Refills: 1
Start: 2018-03-22 | End: 2018-03-24 | Stop reason: SDUPTHER

## 2018-03-22 RX ORDER — CIPROFLOXACIN 500 MG/1
TABLET, FILM COATED ORAL
COMMUNITY
Start: 2018-03-20

## 2018-03-22 NOTE — LETTER
[address] [place] [address] Cardiology - [address] [place] [address] [address] [address]  Phone: [phone]  Fax: [phone]    Andrea Prater CNP        April 4, 2018     [facility]    Patient: Aries Cheney  MR Number: [id number]  YOB: 1940  Date of Visit: 3/22/2018    Dear Dr. Lamont Marion:    Thank you for the request for consultation for Italo Zhou to me for the evaluation. Below are the relevant portions of my assessment and plan of care. [address]   Cardiac Follow-up    Primary Care Doctor:  Lamont Marion MD    Chief Complaint   Patient presents with    1 Month Follow-Up    Congestive Heart Failure    Fatigue        History of Present Illness:   I had the pleasure of seeing Aries Cheney in follow up for dCHF, MS, AS, PAF on coumadin. SInce last visit, she is having some dental issues. She is tired, fatigue. Recent death in her family, her sister passed away. Occasional lightheadedness/dizziness at times. She had recent eye surgery, did well with it. She started on valsartan 20mg daily since last visit, doing well with it. Weight is down to 200lbs at home. If her weight goes up 4-7lbs then she will take an additional lasix in the afternoon. Otherwise, continues on lasix 40mg BID. She is frustrated with the lasix. She is tired of eating out. Her edema is stable. Aries Cheney describes symptoms including dyspnea, palpitations, fatigue but denies chest pain, orthopnea, exertional chest pressure/discomfort, syncope. NYHA:   III  ACC/ AHA Stage:    C    Past Medical History:   has a past medical history of Asthma; Asthma; Atrial fibrillation ([address] Cancer ([address] Cellulitis; CHF (congestive heart failure) ([address] Chronic kidney disease; Chronic pain;  Constipation; COPD (chronic obstructive pulmonary disease) ([address] DDD (degenerative disc disease), lumbar; Falls

## 2018-03-22 NOTE — PROGRESS NOTES
[facility]   Cardiac Follow-up    Primary Care Doctor:  Ashley Gaona MD    Chief Complaint   Patient presents with    1 Month Follow-Up    Congestive Heart Failure    Fatigue        History of Present Illness:   I had the pleasure of seeing Etta Boykin in follow up for dCHF, MS, AS, PAF on coumadin. SInce last visit, she is having some dental issues. She is tired, fatigue. Recent death in her family, her sister passed away. Occasional lightheadedness/dizziness at times. She had recent eye surgery, did well with it. She started on valsartan 20mg daily since last visit, doing well with it. Weight is down to 200lbs at home. If her weight goes up 4-7lbs then she will take an additional lasix in the afternoon. Otherwise, continues on lasix 40mg BID. She is frustrated with the lasix. She is tired of eating out. Her edema is stable. Etta Boykin describes symptoms including dyspnea, palpitations, fatigue but denies chest pain, orthopnea, exertional chest pressure/discomfort, syncope. NYHA:   III  ACC/ AHA Stage:    C    Past Medical History:   has a past medical history of Asthma; Asthma; Atrial fibrillation ([address] Cancer ([address] Cellulitis; CHF (congestive heart failure) ([address] Chronic kidney disease; Chronic pain; Constipation; COPD (chronic obstructive pulmonary disease) ([address] DDD (degenerative disc disease), lumbar; Falls frequently; Fibromyalgia; Fibromyalgia; GERD (gastroesophageal reflux disease); Gout; Headaches, cluster; Hodgkin's disease ([address] Hyperlipidemia; Hypertension; IFG (impaired fasting glucose); Mycobacterium avium complex ([address] Neck fracture ([address] Neuralgic migraines; CANDICE (obstructive sleep apnea); Pernicious anemia; Pneumonia; Prediabetes; Sleep disorder; Thyroid disease; and Unspecified cerebral artery occlusion with cerebral infarction. Surgical History:   has a past surgical history that includes Neck surgery (2010); Tubal ligation;  Hysterectomy; Splenectomy; tumor removal; other surgical history (2012); Rectal surgery (2002); Neck surgery (1980s); Finger surgery (1/9/13); Diagnostic Cardiac Cath Lab Procedure; and bronchoscopy. Social History:   reports that she quit smoking about 31 years ago. Her smoking use included Cigarettes. She has a 22.50 pack-year smoking history. She has never used smokeless tobacco. She reports that she does not drink alcohol or use drugs. Family History:   Family History   Problem Relation Age of Onset    Diabetes Mother     Heart Disease Father     Heart Disease Sister     Cancer Brother     Diabetes Sister     Cancer Brother     Cancer Brother     Cancer Son      NMSC       Home Medications:  Prior to Admission medications    Medication Sig Start Date End Date Taking?  Authorizing Provider   ciprofloxacin (CIPRO) 500 MG tablet  3/20/18  Yes Historical Provider, MD   polyethylene glycol (GLYCOLAX) powder MIX 1 CAPFUL (17GMS) IN LIQUID AND DRINK EVERY DAY 2/23/18  Yes Ron Benitez MD   warfarin (COUMADIN) 3 MG tablet TAKE 3 TABLETS EVERY DAY 2/23/18  Yes Cristóbal Payton MD   montelukast (SINGULAIR) 10 MG tablet TAKE 1 TABLET NIGHTLY 2/23/18  Yes Joselyn Cardoso MD   spironolactone (ALDACTONE) 25 MG tablet TAKE 1 TABLET EVERY DAY 2/23/18  Yes Ron Benitez MD   valsartan (DIOVAN) 40 MG tablet Take 0.5 tablets by mouth daily 1/24/18  Yes Tray Rogers CNP   albuterol sulfate HFA (VENTOLIN HFA) 108 (90 Base) MCG/ACT inhaler Inhale 2 puffs into the lungs every 6 hours as needed for Wheezing Dx J44.9 1/18/18  Yes Karo Styles MD   budesonide-formoterol (SYMBICORT) 80-4.5 MCG/ACT AERO INHALE 2 PUFFS INTO THE LUNGS TWICE DAILY 1/18/18  Yes Karo Styles MD   pantoprazole (PROTONIX) 40 MG tablet TAKE 1 TABLET TWICE DAILY 1/15/18  Yes Ron Benitez MD   venlafaxine (EFFEXOR XR) 150 MG extended release capsule TAKE 1 CAPSULE EVERY DAY 1/15/18  Yes Ron Benitez MD   atorvastatin (LIPITOR) 40 MG tablet TAKE dynamic obstruction with valsalva a peak gradient of 13  mmHg.   -- Mild mitral regurgitation. Moderate mitral stenosis (mean gradient   6mmHg).  -- Moderate aortic regurgitation.   -- Right ventricular hypertrophy is noted.   -- There is a mild to moderate circumferential pericardial effusion noted. No echo tamponade    ECHO 3/25/15   Summary  Normal left ventricle size, wall thickness and systolic function with an  estimated ejection fraction of 65%. No regional wall motion abnormalities  are seen. Diastolic filling parameters suggests diastolic function ( Mitral A   velocity  > 1.5 m/sec in presence of mitral stenosis. Mitral annular calcification is present. The maximum mitral valve pressure gradient is 12 mmHg and the mean   pressure  gradient is 7mmHg. Mild mitral stenosis. Moderate mitral regurgitation. The aortic valve is thickened/calcified with decreased leaflet mobility. The aortic valve area is calculated at 24 cm2 with a maximum pressure  gradient of 14 mmHg and a mean pressure gradient of 14 mmHg. This is c/w  mild aortic stenosis. Moderate aortic regurgitation. Moderate tricuspid regurgitation. Systolic pulmonary artery pressure (SPAP) is normal and estimated at 32   mmHg  (RA pressure 3 mmHg). Mild pulmonic valve regurgitation. There is a moderate circumferential pericardial effusion without   Tamponade physiology    ECHO:2/13: Echocardiogram with Doppler shows mild left ventricular  hypertrophy with ejection fraction of 55%. Stage 2 diastolic  dysfunction of the left ventricle is present. Mild mitral  regurgitation and moderate aortic regurgitation is present. There  is mild pulmonary hypertension      Pertinent Problems:  · Chronic diastolic heart failure. NYHA class III  · PAF on coumadin  · Moderate aortic regurgitation  · Moderate mitral stenosis  · Fatigue  · HLD  · COPD with asthma overlap    Visit Diagnosis:    1. Chronic diastolic CHF (congestive heart failure) ([address]    2.

## 2018-03-23 ENCOUNTER — HOSPITAL ENCOUNTER (OUTPATIENT)
Dept: GENERAL RADIOLOGY | Age: 78
Discharge: HOME | End: 2018-03-23
Attending: NURSE PRACTITIONER | Admitting: NURSE PRACTITIONER

## 2018-03-23 ENCOUNTER — ANTI-COAG VISIT (OUTPATIENT)
Dept: PHARMACY | Facility: CLINIC | Age: 78
End: 2018-03-23

## 2018-03-23 DIAGNOSIS — I50.32 CHRONIC DIASTOLIC CHF (CONGESTIVE HEART FAILURE) (HCC): ICD-10-CM

## 2018-03-23 DIAGNOSIS — R06.02 SHORTNESS OF BREATH: ICD-10-CM

## 2018-03-23 DIAGNOSIS — R53.82 CHRONIC FATIGUE: ICD-10-CM

## 2018-03-23 LAB
ANION GAP SERPL CALCULATED.3IONS-SCNC: 13 MMOL/L (ref 3–16)
BUN BLDV-MCNC: 22 MG/DL (ref 7–20)
CALCIUM SERPL-MCNC: 8.6 MG/DL (ref 8.3–10.6)
CHLORIDE BLD-SCNC: 94 MMOL/L (ref 99–110)
CO2: 31 MMOL/L (ref 21–32)
CREAT SERPL-MCNC: 1.3 MG/DL (ref 0.6–1.2)
GFR AFRICAN AMERICAN: 48
GFR NON-AFRICAN AMERICAN: 39
GLUCOSE BLD-MCNC: 97 MG/DL (ref 70–99)
INR BLD: 2.7
IRON SATURATION: 15 % (ref 15–50)
IRON: 64 UG/DL (ref 37–145)
POTASSIUM SERPL-SCNC: 4 MMOL/L (ref 3.5–5.1)
PRO-BNP: 1264 PG/ML (ref 0–449)
SODIUM BLD-SCNC: 138 MMOL/L (ref 136–145)
TOTAL IRON BINDING CAPACITY: 435 UG/DL (ref 260–445)

## 2018-03-26 ENCOUNTER — TELEPHONE (OUTPATIENT)
Dept: CARDIOLOGY CLINIC | Age: 78
End: 2018-03-26

## 2018-03-26 RX ORDER — METOPROLOL TARTRATE 100 MG/1
TABLET ORAL
Qty: 270 TABLET | Refills: 1 | Status: SHIPPED | OUTPATIENT
Start: 2018-03-26

## 2018-04-01 ENCOUNTER — HOSPITAL ENCOUNTER (OUTPATIENT)
Dept: OTHER | Age: 78
Discharge: HOME | End: 2018-04-30
Attending: INTERNAL MEDICINE | Admitting: INTERNAL MEDICINE

## 2018-04-01 ENCOUNTER — HOSPITAL ENCOUNTER (OUTPATIENT)
Dept: OTHER | Age: 78
Discharge: HOME | End: 2018-04-30
Attending: NURSE PRACTITIONER | Admitting: NURSE PRACTITIONER

## 2018-04-02 RX ORDER — IPRATROPIUM BROMIDE AND ALBUTEROL SULFATE 2.5; .5 MG/3ML; MG/3ML
SOLUTION RESPIRATORY (INHALATION)
Qty: 180 VIAL | Refills: 5 | Status: SHIPPED | OUTPATIENT
Start: 2018-04-02

## 2018-04-03 ENCOUNTER — CARE COORDINATION (OUTPATIENT)
Dept: CARE COORDINATION | Age: 78
End: 2018-04-03

## 2018-04-04 NOTE — COMMUNICATION BODY
[address]   Cardiac Follow-up    Primary Care Doctor:  Aruna East MD    Chief Complaint   Patient presents with    1 Month Follow-Up    Congestive Heart Failure    Fatigue        History of Present Illness:   I had the pleasure of seeing Gómez Haney in follow up for dCHF, MS, AS, PAF on coumadin. SInce last visit, she is having some dental issues. She is tired, fatigue. Recent death in her family, her sister passed away. Occasional lightheadedness/dizziness at times. She had recent eye surgery, did well with it. She started on valsartan 20mg daily since last visit, doing well with it. Weight is down to 200lbs at home. If her weight goes up 4-7lbs then she will take an additional lasix in the afternoon. Otherwise, continues on lasix 40mg BID. She is frustrated with the lasix. She is tired of eating out. Her edema is stable. Gómez Haney describes symptoms including dyspnea, palpitations, fatigue but denies chest pain, orthopnea, exertional chest pressure/discomfort, syncope. NYHA:   III  ACC/ AHA Stage:    C    Past Medical History:   has a past medical history of Asthma; Asthma; Atrial fibrillation ([address] Cancer ([address] Cellulitis; CHF (congestive heart failure) ([address] Chronic kidney disease; Chronic pain; Constipation; COPD (chronic obstructive pulmonary disease) ([address] DDD (degenerative disc disease), lumbar; Falls frequently; Fibromyalgia; Fibromyalgia; GERD (gastroesophageal reflux disease); Gout; Headaches, cluster; Hodgkin's disease ([address] Hyperlipidemia; Hypertension; IFG (impaired fasting glucose); Mycobacterium avium complex ([address] Neck fracture ([address] Neuralgic migraines; CANDICE (obstructive sleep apnea); Pernicious anemia; Pneumonia; Prediabetes; Sleep disorder; Thyroid disease; and Unspecified cerebral artery occlusion with cerebral infarction. Surgical History:   has a past surgical history that includes Neck surgery (2010); Tubal ligation;  Hysterectomy; MD   Calcium Carb-Cholecalciferol (CALCIUM + D3 PO) Take 1,200 mg by mouth daily   Yes Historical Provider, MD   docusate sodium (COLACE) 100 MG capsule Take 100 mg by mouth daily    Yes Historical Provider, MD   acetaminophen (TYLENOL 8 HOUR) 650 MG CR tablet Take 650 mg by mouth every 6 hours as needed. Yes Historical Provider, MD        Allergies:  Codeine; Lisinopril; Unasyn [ampicillin-sulbactam sodium]; and Vancomycin     Review of Systems:   · Constitutional: there has been no unanticipated weight loss. · Eyes: No vision changes  · ENT: No Headaches, no nasal congestion. No mouth sores or sore throat. · Cardiovascular: Reviewed in HPI  · Respiratory: No cough or wheezing, no sputum production. · Gastrointestinal: No abdominal pain, no constipation or diarrhea   · Genitourinary: No dysuria, trouble voiding, or hematuria, + incontinence  · Musculoskeletal:  No weakness or joint complaints. · Integumentary: No rash or pruritis. · Neurological: No numbness or tingling. No weakness. No tremor. · Psychiatric: No anxiety, no depression, + acute grieving   · Endocrine:  No excessive thirst or urination. · Hematologic/Lymphatic: No abnormal bruising or bleeding, blood clots or swollen lymph nodes.     Physical Examination:    Vitals:    03/22/18 1241   BP: (!) 110/42   Pulse: 84   SpO2: 94%   Weight: 203 lb (92.1 kg)   Height: 5' 5\" (1.651 m)        Constitutional and General Appearance: no apparent distress, appears tired  HEENT: non-icteric sclera, oropharynx without exudate, oral mucosa moist  Neck: JVP less than 8cm H20, kyphosis of neck  Respiratory:  · No use of accessory muscles  · Clear breath sounds throughout, no wheezing, no crackles, no rhonchi  Cardiovascular:  · The apical impulses not displaced  · +  Murmur, no rub/gallop  · Regular rate and irregular rhythm, S1,S2 normal  · Radial pulses 2+ and equal bilaterally  · No BLE edema  · Pedal Pulses: 2+ and equal   Abdomen:  · No masses or tenderness  · Liver: No Abnormalities Noted  Musculoskeletal/Skin:  · Exhibits normal gait balance and coordination  · There is no clubbing, cyanosis of the extremities  · Skin is warm and dry  · Moves all extremities well  Neurological/Psychiatric:  · Alert and oriented in all spheres  · No abnormalities of mood, affect, memory, mentation, or behavior are noted    Lab Data:  CBC:   Lab Results   Component Value Date    WBC 6.2 09/19/2017    WBC 8.8 03/05/2017    WBC 8.3 03/03/2017    RBC 3.64 09/19/2017    RBC 3.47 03/05/2017    RBC 3.75 03/03/2017    HGB 11.2 09/19/2017    HGB 11.0 03/05/2017    HGB 11.9 03/03/2017    HCT 33.8 09/19/2017    HCT 32.8 03/05/2017    HCT 35.5 03/03/2017    MCV 93.0 09/19/2017    MCV 94.3 03/05/2017    MCV 94.6 03/03/2017    RDW 15.3 09/19/2017    RDW 14.6 03/05/2017    RDW 14.7 03/03/2017     09/19/2017     03/05/2017     03/03/2017     BMP:   Lab Results   Component Value Date     02/09/2018     01/24/2018     10/24/2017    K 4.5 02/09/2018    K 4.4 01/24/2018    K 4.6 10/24/2017    CL 91 02/09/2018    CL 94 01/24/2018    CL 94 10/24/2017    CO2 31 02/09/2018    CO2 32 01/24/2018    CO2 33 10/24/2017    PHOS 4.1 03/06/2017    PHOS 4.0 03/05/2017    PHOS 4.0 03/04/2017    BUN 27 02/09/2018    BUN 24 01/24/2018    BUN 26 10/24/2017    CREATININE 1.2 02/09/2018    CREATININE 1.1 01/24/2018    CREATININE 1.0 10/24/2017     BNP:   Lab Results   Component Value Date    PROBNP 809 02/09/2018    PROBNP 805 01/24/2018    PROBNP 824 10/24/2017       Recent Testing:  Echo 3/2017  Summary   -- Normal left ventricle size. There is mild concentric left ventricular   hypertrophy. There is hyperdynamic LV systolic function with an estimated ejection fraction of > 65%.   No regional wall motion abnormalities are seen.  Elevated left ventricular diastolic filling pressure.   -- There is a late peaking gradient recorded across the LV outflow tract consistent with dynamic obstruction with valsalva a peak gradient of 13  mmHg.   -- Mild mitral regurgitation. Moderate mitral stenosis (mean gradient   6mmHg).  -- Moderate aortic regurgitation.   -- Right ventricular hypertrophy is noted.   -- There is a mild to moderate circumferential pericardial effusion noted. No echo tamponade    ECHO 3/25/15   Summary  Normal left ventricle size, wall thickness and systolic function with an  estimated ejection fraction of 65%. No regional wall motion abnormalities  are seen. Diastolic filling parameters suggests diastolic function ( Mitral A   velocity  > 1.5 m/sec in presence of mitral stenosis. Mitral annular calcification is present. The maximum mitral valve pressure gradient is 12 mmHg and the mean   pressure  gradient is 7mmHg. Mild mitral stenosis. Moderate mitral regurgitation. The aortic valve is thickened/calcified with decreased leaflet mobility. The aortic valve area is calculated at 24 cm2 with a maximum pressure  gradient of 14 mmHg and a mean pressure gradient of 14 mmHg. This is c/w  mild aortic stenosis. Moderate aortic regurgitation. Moderate tricuspid regurgitation. Systolic pulmonary artery pressure (SPAP) is normal and estimated at 32   mmHg  (RA pressure 3 mmHg). Mild pulmonic valve regurgitation. There is a moderate circumferential pericardial effusion without   Tamponade physiology    ECHO:2/13: Echocardiogram with Doppler shows mild left ventricular  hypertrophy with ejection fraction of 55%. Stage 2 diastolic  dysfunction of the left ventricle is present. Mild mitral  regurgitation and moderate aortic regurgitation is present. There  is mild pulmonary hypertension      Pertinent Problems:  · Chronic diastolic heart failure. NYHA class III  · PAF on coumadin  · Moderate aortic regurgitation  · Moderate mitral stenosis  · Fatigue  · HLD  · COPD with asthma overlap    Visit Diagnosis:    1. Chronic diastolic CHF (congestive heart failure) ([address]    2.

## 2018-04-05 ENCOUNTER — TELEPHONE (OUTPATIENT)
Dept: FAMILY MEDICINE CLINIC | Age: 78
End: 2018-04-05

## 2018-04-05 RX ORDER — VALSARTAN 40 MG/1
20 TABLET ORAL 2 TIMES DAILY
Qty: 30 TABLET | Refills: 3 | Status: SHIPPED | OUTPATIENT
Start: 2018-04-05

## 2018-04-16 ENCOUNTER — TELEPHONE (OUTPATIENT)
Dept: CARDIOLOGY CLINIC | Age: 78
End: 2018-04-16

## 2018-04-16 DIAGNOSIS — R06.02 SHORTNESS OF BREATH: Primary | ICD-10-CM

## 2018-05-21 ENCOUNTER — TELEPHONE (OUTPATIENT)
Dept: FAMILY MEDICINE CLINIC | Age: 78
End: 2018-05-21

## 2018-05-22 ENCOUNTER — CARE COORDINATION (OUTPATIENT)
Dept: CARE COORDINATION | Age: 78
End: 2018-05-22